# Patient Record
Sex: FEMALE | Race: WHITE | NOT HISPANIC OR LATINO | Employment: UNEMPLOYED | URBAN - METROPOLITAN AREA
[De-identification: names, ages, dates, MRNs, and addresses within clinical notes are randomized per-mention and may not be internally consistent; named-entity substitution may affect disease eponyms.]

---

## 2018-12-04 RX ORDER — LAMIVUDINE AND ZIDOVUDINE 150; 300 MG/1; MG/1
1 TABLET, FILM COATED ORAL EVERY 12 HOURS
COMMUNITY
End: 2018-12-05 | Stop reason: ALTCHOICE

## 2018-12-04 RX ORDER — ALPRAZOLAM 0.25 MG/1
0.25 TABLET ORAL 3 TIMES DAILY
COMMUNITY
End: 2018-12-05

## 2018-12-04 RX ORDER — LIDOCAINE 50 MG/G
1 PATCH TOPICAL
COMMUNITY

## 2018-12-04 RX ORDER — ATORVASTATIN CALCIUM 20 MG/1
20 TABLET, FILM COATED ORAL DAILY
COMMUNITY
End: 2021-11-18

## 2018-12-05 ENCOUNTER — OFFICE VISIT (OUTPATIENT)
Dept: INFECTIOUS DISEASES | Facility: CLINIC | Age: 58
End: 2018-12-05
Payer: COMMERCIAL

## 2018-12-05 VITALS
HEIGHT: 66 IN | SYSTOLIC BLOOD PRESSURE: 118 MMHG | BODY MASS INDEX: 27.32 KG/M2 | HEART RATE: 82 BPM | WEIGHT: 170 LBS | TEMPERATURE: 98 F | OXYGEN SATURATION: 98 % | DIASTOLIC BLOOD PRESSURE: 76 MMHG

## 2018-12-05 DIAGNOSIS — M85.80 OSTEOPENIA, UNSPECIFIED LOCATION: ICD-10-CM

## 2018-12-05 DIAGNOSIS — E78.5 HYPERLIPIDEMIA, UNSPECIFIED HYPERLIPIDEMIA TYPE: ICD-10-CM

## 2018-12-05 DIAGNOSIS — B20 HUMAN IMMUNODEFICIENCY VIRUS (HIV) DISEASE: Primary | ICD-10-CM

## 2018-12-05 DIAGNOSIS — Z79.899 ENCOUNTER FOR LONG-TERM (CURRENT) USE OF MEDICATIONS: ICD-10-CM

## 2018-12-05 DIAGNOSIS — Z12.31 ENCOUNTER FOR SCREENING MAMMOGRAM FOR BREAST CANCER: ICD-10-CM

## 2018-12-05 PROCEDURE — 99214 OFFICE O/P EST MOD 30 MIN: CPT | Mod: ,,, | Performed by: INTERNAL MEDICINE

## 2018-12-05 PROCEDURE — 3008F BODY MASS INDEX DOCD: CPT | Mod: ,,, | Performed by: INTERNAL MEDICINE

## 2018-12-05 RX ORDER — BICTEGRAVIR SODIUM, EMTRICITABINE, AND TENOFOVIR ALAFENAMIDE FUMARATE 50; 200; 25 MG/1; MG/1; MG/1
1 TABLET ORAL DAILY
Refills: 5 | COMMUNITY
Start: 2018-11-26 | End: 2020-12-14 | Stop reason: ALTCHOICE

## 2018-12-05 RX ORDER — PROGESTERONE 100 MG/1
100 CAPSULE ORAL NIGHTLY
Refills: 11 | COMMUNITY
Start: 2018-11-19

## 2018-12-05 NOTE — PROGRESS NOTES
Subjective:       Patient ID: Tiffani Guerrero is a 58 y.o. female.    Chief Complaint:: Follow-up (6 mos check up )    HPI  Been on Biktarvy, which she likes and has not taken the atorvastatin yet  No flu vaccine, and refused in 2016 and 17.  is undergoing chemotherapy  Feels good, has a hormone pellet in her right hip(she is unclear as to what exactly is in it), getting new pellet today   was diagnosed with metastatic colon cancer and is receiving treatment at [facility]    Current Outpatient Medications:     BIKTARVY -25 mg Tab, Take 1 tablet by mouth once daily., Disp: , Rfl: 5    lidocaine (LIDODERM) 5 %, Place 1 patch onto the skin every 24 hours. 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 Ocular    Hyperlipidemia     MVP (mitral valve prolapse) 2010    Negative ETT, nuclear stress 06/2015, echo w/myxomatous MV    Osteopenia 2008    Pyelonephritis 12/10/2012     Past Surgical History:   Procedure Laterality Date    BACK SURGERY  2003    Extensive surgery for thoracic vertebral osteomyelitis (enterobacter) with spinal cord compression. (decompressive laminectomy T11 with post instrumentation T7-8 and T12-L1    Breast Augmentation Bilateral 1984    With redo 2008.    feet  Bilateral     reconstruction     FOOT SURGERY  1978    Maybe 1979     HYSTERECTOMY  1988    And one ovary for endometriosis     Podiactric surgery  09/2016    and 06/2017 to remove 2 screws    RHIZOTOMY      for back pain    Shoulder Injections  2013    For Frozen shoulders     Social History     Socioeconomic History    Marital status:      Spouse name: None    Number of children: 3    Years of education: None    Highest education level: None   Social Needs    Financial resource strain: None    Food insecurity - worry: None    Food insecurity - inability: None    Transportation needs - medical: None    Transportation needs - non-medical: None   Occupational History    Occupation: Disabled   Tobacco Use    Smoking status: Never Smoker    Smokeless tobacco: Never Used   Substance and Sexual Activity    Alcohol use: Yes     Comment: Rarely    Drug use: None    Sexual activity: None   Other Topics Concern    None   Social History Narrative    None     Family History   Problem Relation Age of Onset    Stomach cancer Mother         Metastatic       Travel History: Born in [place], lived in [place], lived in [facility], [place]  Vaccine History: Pneumovax less than 2006, Prevnar 2013, flu vaccine 2012-15, hepatitis A 1, 2 2008, hepatitis B ×3 Menactra 2018, shinGrix ×1 2018  Advanced Directive:   Safer Sex: Monogamous and protected with   Gyn: 6/2018  Mammogram: 12/2017  Bone Density: 2011, osteopenia better  Colonoscopy:2008 diverticulosis, 2017 polyps    Review of Systems    Constitutional: No fever, chills, sweats, fatigue, weakness, weight loss    Eyes: No change in vision, loss of vision, diplopia, photophobia. UTD eye exam    ENT: No sinus drainage, sore throat, mouth pain, or lesions. UTD dental exam    Cardiovascular: No chest pain, BOSS, palpitations or pedal edema    Respiratory: No shortness of breath, BOSS, cough, wheeze, sputum, pleurisy, or hemoptysis    Gastrointestinal: No abdominal pain, nausea, vomiting, diarrhea, constipation, blood in stool, or focal abd pain    Genitourinary: No dysuria,  "hematuria, incontinence, frequency, flank pain, nocturia, or urethritis    Musculoskeletal: No new pain, joint swelling, or injuries. Chronic back pain for which she no longer takes any pain medicine    Integumentary: No new rashes, lesions, or wounds    Neurological: No dizziness, vertigo, unusual headaches, neuropathy, or falls    Psychiatric: No anxiety, depression, memory loss, sleep disturbance or substance abuse    Endocrine: no diabetes, Thyroid normal and feels some much better since she has had hormone pellets inserted    Lymphatic: No lymphadenopathy, blood loss, anemia, or malignancy    Objective:      Blood pressure 118/76, pulse 82, temperature 98 °F (36.7 °C), temperature source Oral, height 5' 6" (1.676 m), weight 77.1 kg (170 lb), SpO2 98 %. Body mass index is 27.44 kg/m².  Physical Exam      General: Alert and attentive, cooperative and in no distress    Eyes: Pupils equal, round, reactive to light, anicteric, EOMI    Neck: Supple, non-tender, no thyromegaly or masses    ENT: EAC patent, TM normal, nares patent, no oral lesions, teeth in good condition, no thrush    Cardiovascular: Regular rate and rhythm, no murmurs, rubs, or gallop    Respiratory: Lungs clear without wheezes, no rales, rub or rhonci    Gastrointestinal: Active bowel sounds, soft, no mass or organomegaly, no tenderness or distention    Genitourinary: No flank tenderness    Vascular: No peripheral edema, phlebitis, pulses normal. Warm and well perfused    Musculoskeletal: Ambulates without difficulty, no acute arthritis, synovitis or myositis. Normal muscle bulk and strength. Still reclines and sits up slowly because of her back    Integumentary: Skin without rashes, lesions, or wounds    AnusRectum:     Neurological: Normal LOC, cranial nerves, speech, reflexes, normal gait    Psychiatric: Normal mood, speech, demeanor    Lymphatic: No cervical, supraclavicular, axillary, or inguinal lymphadenopathy      Wound:     HIV Table: "   HLA-B 5701     TOXOIgG     RPR 9/18/2018 non reactive   HEP A IgG     HBsAg  negative   HBsAb 6/16/2017 non reactive p vax   HBcAb  negative   HBeAb     HBeAg     HCVAb 9/2/2016 non reactive   HBV DNA     HCV RNA     Vitamin D 6/16/2017 normal 83   Chlamydia / GC     TB Gold                       9/2016                 negative      Recent Diagnostics: reviewed 9/2018 lab       Assessment and Plan:           Human immunodeficiency virus (HIV) disease  -     Lipid panel; Future; Expected date: 12/05/2018  -     Comprehensive metabolic panel; Future; Expected date: 12/05/2018  -     HIV RNA, quantitative, PCR; Future; Expected date: 12/05/2018    Hyperlipidemia, unspecified hyperlipidemia type  -     Lipid panel; Future; Expected date: 12/05/2018    Encounter for screening mammogram for breast cancer  -     Mammo Digital Diagnostic Bilat with CAD; Future; Expected date: 12/11/2018    Osteopenia, unspecified location    Encounter for long-term (current) use of medications    Viral load and lipids and CMP, [facility]    Please get a flu vaccine    Bone density end of 2019    Return in 6 months      This note was created using Dragon voice recognition software that occasionally misinterpreted phrases or words.

## 2018-12-05 NOTE — PATIENT INSTRUCTIONS
Viral load and lipids and CMP, [facility]    Please get a flu vaccine    Bone density end of 2019    Return in 6 months

## 2019-06-12 ENCOUNTER — OFFICE VISIT (OUTPATIENT)
Dept: INFECTIOUS DISEASES | Facility: CLINIC | Age: 59
End: 2019-06-12
Payer: COMMERCIAL

## 2019-06-12 VITALS
BODY MASS INDEX: 28.61 KG/M2 | WEIGHT: 178 LBS | TEMPERATURE: 98 F | OXYGEN SATURATION: 98 % | HEIGHT: 66 IN | DIASTOLIC BLOOD PRESSURE: 75 MMHG | HEART RATE: 70 BPM | SYSTOLIC BLOOD PRESSURE: 121 MMHG

## 2019-06-12 DIAGNOSIS — E78.5 HYPERLIPIDEMIA, UNSPECIFIED HYPERLIPIDEMIA TYPE: ICD-10-CM

## 2019-06-12 DIAGNOSIS — B20 HUMAN IMMUNODEFICIENCY VIRUS (HIV) DISEASE: Primary | ICD-10-CM

## 2019-06-12 DIAGNOSIS — Z79.899 ENCOUNTER FOR LONG-TERM (CURRENT) USE OF MEDICATIONS: ICD-10-CM

## 2019-06-12 PROCEDURE — 3008F BODY MASS INDEX DOCD: CPT | Mod: ,,, | Performed by: INTERNAL MEDICINE

## 2019-06-12 PROCEDURE — 99214 OFFICE O/P EST MOD 30 MIN: CPT | Mod: ,,, | Performed by: INTERNAL MEDICINE

## 2019-06-12 PROCEDURE — 3008F PR BODY MASS INDEX (BMI) DOCUMENTED: ICD-10-PCS | Mod: ,,, | Performed by: INTERNAL MEDICINE

## 2019-06-12 PROCEDURE — 99214 PR OFFICE/OUTPT VISIT, EST, LEVL IV, 30-39 MIN: ICD-10-PCS | Mod: ,,, | Performed by: INTERNAL MEDICINE

## 2019-06-12 NOTE — PROGRESS NOTES
Subjective:       Patient ID: Tiffani Guerrero is a 59 y.o. female.    Chief Complaint:: 6 mos check up    HPI  12/5/19: Been on Biktarvy, which she likes and has not taken the atorvastatin yet  No flu vaccine, and refused in 2016 and 17.  is undergoing chemotherapy  Feels good, has a hormone pellet in her right hip(she is unclear as to what exactly is in it), getting new pellet today   was diagnosed with metastatic colon cancer and is receiving treatment at [facility]    6/12/19:  is doing well on chemotherapy for colon cancer. Can't find the lab from December. She is adherent to cholesterol meds.   The hormone pellet is called bioTE(still don't know what it is in it.) and she feels great. The gyn is following her hormone levels and adjusting the dose. Going to the gym and walking.   Gained 7 pounds. Back pain is under control. She takes lots of vitamins    Current Outpatient Medications:     atorvastatin (LIPITOR) 20 MG tablet, Take 20 mg by mouth once daily., Disp: , Rfl:     BIKTARVY -25 mg Tab, Take 1 tablet by mouth once daily., Disp: , Rfl: 5    lidocaine (LIDODERM) 5 %, Place 1 patch onto the skin every 24 hours. 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 Ocular    Hyperlipidemia     MVP (mitral valve prolapse) 2010    Negative ETT, nuclear stress 06/2015, echo w/myxomatous MV    Osteopenia 2008    Pyelonephritis 12/10/2012     Past Surgical History:    Procedure Laterality Date    BACK SURGERY  2003    Extensive surgery for thoracic vertebral osteomyelitis (enterobacter) with spinal cord compression. (decompressive laminectomy T11 with post instrumentation T7-8 and T12-L1    Breast Augmentation Bilateral 1984    With redo 2008.    feet  Bilateral     reconstruction     FOOT SURGERY  1978    Maybe 1979     HYSTERECTOMY  1988    And one ovary for endometriosis    Podiactric surgery  09/2016    and 06/2017 to remove 2 screws    RHIZOTOMY      for back pain    Shoulder Injections  2013    For Frozen shoulders     Social History     Socioeconomic History    Marital status:      Spouse name: Not on file    Number of children: 3    Years of education: Not on file    Highest education level: Not on file   Occupational History    Occupation: Disabled   Social Needs    Financial resource strain: Not on file    Food insecurity:     Worry: Not on file     Inability: Not on file    Transportation needs:     Medical: Not on file     Non-medical: Not on file   Tobacco Use    Smoking status: Never Smoker    Smokeless tobacco: Never Used   Substance and Sexual Activity    Alcohol use: Yes     Comment: Rarely    Drug use: Not on file    Sexual activity: Not on file   Lifestyle    Physical activity:     Days per week: Not on file     Minutes per session: Not on file    Stress: Not on file   Relationships    Social connections:     Talks on phone: Not on file     Gets together: Not on file     Attends Shinto service: Not on file     Active member of club or organization: Not on file     Attends meetings of clubs or organizations: Not on file     Relationship status: Not on file   Other Topics Concern    Not on file   Social History Narrative    Not on file     Family History   Problem Relation Age of Onset    Stomach cancer Mother         Metastatic       Travel History: Born in [place], lived in [place], lived in [place], [place]  Vaccine  "History: Pneumovax less than 2006, Prevnar 2013, flu vaccine 2012-15, hepatitis A 1, 2 2008, hepatitis B ×3 Menactra 2018, shinGrix × 2 2018  Advanced Directive:   Safer Sex: Monogamous and protected with   Gyn: 6/2018  Mammogram: 1/2019  Bone Density: 2011, osteopenia better  Colonoscopy:2008 diverticulosis, 2017 polyps    Review of Systems    Constitutional: No fever, chills, sweats, fatigue, weakness, weight loss. Gained 7 lbs    Eyes: No change in vision, loss of vision, diplopia, photophobia. UTD eye exam    ENT: Mild allergic sinus drainage and uses a Amanda pot with sterile water and a salt solution she bought from the Internet, no sore throat, mouth pain, or lesions. UTD dental exam    Cardiovascular: No chest pain, BOSS, palpitations or pedal edema    Respiratory: No shortness of breath, BOSS, cough, wheeze, sputum, pleurisy, or hemoptysis    Gastrointestinal: No abdominal pain, nausea, vomiting, diarrhea, constipation, blood in stool, or focal abd pain    Genitourinary: No dysuria, hematuria, incontinence, frequency, flank pain, nocturia, or urethritis. She drinks plenty of liquids    Musculoskeletal: No new pain, joint swelling, or injuries. Chronic back pain for which she no longer takes any pain medicine    Integumentary: No new rashes, lesions, or wounds and was examined by dermatology recently    Neurological: No dizziness, vertigo, unusual headaches, neuropathy, or falls    Psychiatric: No anxiety, depression, memory loss, sleep disturbance or substance abuse    Endocrine: no diabetes, Thyroid normal and feels some much better since she has had hormone pellets inserted    Lymphatic: No lymphadenopathy, blood loss, anemia, or malignancy    Objective:      Blood pressure 121/75, pulse 70, temperature 97.7 °F (36.5 °C), height 5' 6" (1.676 m), weight 80.7 kg (178 lb), SpO2 98 %. Body mass index is 28.73 kg/m².  Physical Exam      General: Alert and attentive, cooperative and in no " distress    Eyes: Pupils equal, round, reactive to light, anicteric, EOMI    Neck: Supple, non-tender, no thyromegaly or masses    ENT: EAC patent, TM normal, nares patent, no oral lesions, teeth in good condition, no thrush    Cardiovascular: Regular rate and rhythm, no murmurs, rubs, or gallop    Respiratory: Lungs clear without wheezes, no rales, rub or rhonci    Gastrointestinal: Active bowel sounds, soft, no mass or organomegaly, no tenderness or distention    Genitourinary: No flank tenderness    Vascular: No peripheral edema, phlebitis, pulses normal. Warm and well perfused    Musculoskeletal: Ambulates without difficulty, no acute arthritis, synovitis or myositis. Normal muscle bulk and strength. Still reclines and sits up slowly because of her back    Integumentary: Skin without rashes, lesions, or wounds. She has a café au lait spot left lower flank and a benign nevus upper buttock    AnusRectum:     Neurological: Normal LOC, cranial nerves, speech, reflexes, normal gait. She still moves a little slowly getting up and down from the exam table   Psychiatric: Normal mood, speech, demeanor    Lymphatic: No cervical, supraclavicular, axillary, or inguinal lymphadenopathy      Wound:     HIV Table:   HLA-B 5701     TOXOIgG 8/18/2004 negative   RPR 9/18/2018 non reactive   HEP A IgG     HBsAg  negative   HBsAb 6/16/2017 non reactive p vax   HBcAb  negative   HBeAb     HBeAg     HCVAb 9/2/2016 non reactive   HBV DNA     HCV RNA 8/18/2004 negative   Vitamin D 6/16/2017 normal 83   Chlamydia / GC     TB Gold                       9/2016                 negative      Recent Diagnostics: reviewed 9/2018 lab. The December lab we ordered was not drawn       Assessment and Plan:           Human immunodeficiency virus (HIV) disease  -     CBC auto differential; Future; Expected date: 06/12/2019  -     HIV RNA, quantitative, PCR; Future; Expected date: 06/12/2019  -     Urinalysis; Future; Expected date: 06/12/2019  -      Hemoglobin A1c; Future; Expected date: 06/12/2019    Hyperlipidemia, unspecified hyperlipidemia type  -     Comprehensive metabolic panel; Future; Expected date: 06/12/2019  -     Lipid panel; Future; Expected date: 06/12/2019    Encounter for long-term (current) use of medications           Bone density end of 2019  Lab, fasting  CBC CMP viral load, lipids, UA and hemoglobin A1c    Return for flu shot in November   And visit in 12 2019 with lab before    Same medications      This note was created using Dragon voice recognition software that occasionally misinterpreted phrases or words.

## 2019-06-12 NOTE — PATIENT INSTRUCTIONS
Lab, fasting  CBC CMP viral load, lipids, UA and hemoglobin A1c    Return for flu shot in November   And visit in 12 2019 with lab before    Same medications

## 2019-10-11 ENCOUNTER — OFFICE VISIT (OUTPATIENT)
Dept: ENDOCRINOLOGY | Facility: CLINIC | Age: 59
End: 2019-10-11
Payer: COMMERCIAL

## 2019-10-11 ENCOUNTER — LAB VISIT (OUTPATIENT)
Dept: LAB | Facility: HOSPITAL | Age: 59
End: 2019-10-11
Attending: INTERNAL MEDICINE
Payer: COMMERCIAL

## 2019-10-11 VITALS
TEMPERATURE: 98 F | DIASTOLIC BLOOD PRESSURE: 60 MMHG | HEART RATE: 74 BPM | SYSTOLIC BLOOD PRESSURE: 100 MMHG | WEIGHT: 182.75 LBS | BODY MASS INDEX: 29.37 KG/M2 | HEIGHT: 66 IN

## 2019-10-11 DIAGNOSIS — Z78.0 POSTMENOPAUSAL: ICD-10-CM

## 2019-10-11 DIAGNOSIS — R73.9 HYPERGLYCEMIA: ICD-10-CM

## 2019-10-11 DIAGNOSIS — E03.9 HYPOTHYROIDISM (ACQUIRED): ICD-10-CM

## 2019-10-11 DIAGNOSIS — E78.00 HYPERCHOLESTEROLEMIA: ICD-10-CM

## 2019-10-11 DIAGNOSIS — E06.9 THYROIDITIS: ICD-10-CM

## 2019-10-11 DIAGNOSIS — Z79.890 HORMONE REPLACEMENT THERAPY: ICD-10-CM

## 2019-10-11 DIAGNOSIS — E78.5 HYPERLIPIDEMIA, UNSPECIFIED HYPERLIPIDEMIA TYPE: ICD-10-CM

## 2019-10-11 DIAGNOSIS — Z90.710 POST HYSTERECTOMY MENOPAUSE: ICD-10-CM

## 2019-10-11 DIAGNOSIS — M85.89 OSTEOPENIA OF MULTIPLE SITES: ICD-10-CM

## 2019-10-11 DIAGNOSIS — E55.9 HYPOVITAMINOSIS D: ICD-10-CM

## 2019-10-11 DIAGNOSIS — E89.40 POST HYSTERECTOMY MENOPAUSE: ICD-10-CM

## 2019-10-11 DIAGNOSIS — Z21 HIV POSITIVE: ICD-10-CM

## 2019-10-11 DIAGNOSIS — E03.9 HYPOTHYROIDISM (ACQUIRED): Primary | ICD-10-CM

## 2019-10-11 DIAGNOSIS — E04.9 GOITER: ICD-10-CM

## 2019-10-11 DIAGNOSIS — R79.89 ABNORMAL THYROID BLOOD TEST: ICD-10-CM

## 2019-10-11 DIAGNOSIS — E07.9 THYROID DISEASE: ICD-10-CM

## 2019-10-11 LAB
ALBUMIN SERPL BCP-MCNC: 4.2 G/DL (ref 3.5–5.2)
ALP SERPL-CCNC: 83 U/L (ref 55–135)
ALT SERPL W/O P-5'-P-CCNC: 23 U/L (ref 10–44)
ANION GAP SERPL CALC-SCNC: 9 MMOL/L (ref 8–16)
AST SERPL-CCNC: 27 U/L (ref 10–40)
BASOPHILS # BLD AUTO: 0.06 K/UL (ref 0–0.2)
BASOPHILS NFR BLD: 0.9 % (ref 0–1.9)
BILIRUB SERPL-MCNC: 0.3 MG/DL (ref 0.1–1)
BUN SERPL-MCNC: 15 MG/DL (ref 6–20)
CALCIUM SERPL-MCNC: 9.9 MG/DL (ref 8.7–10.5)
CHLORIDE SERPL-SCNC: 102 MMOL/L (ref 95–110)
CHOLEST SERPL-MCNC: 221 MG/DL (ref 120–199)
CHOLEST/HDLC SERPL: 3.9 {RATIO} (ref 2–5)
CO2 SERPL-SCNC: 27 MMOL/L (ref 23–29)
CREAT SERPL-MCNC: 0.9 MG/DL (ref 0.5–1.4)
DIFFERENTIAL METHOD: ABNORMAL
EOSINOPHIL # BLD AUTO: 0.4 K/UL (ref 0–0.5)
EOSINOPHIL NFR BLD: 6.8 % (ref 0–8)
ERYTHROCYTE [DISTWIDTH] IN BLOOD BY AUTOMATED COUNT: 12.8 % (ref 11.5–14.5)
EST. GFR  (AFRICAN AMERICAN): >60 ML/MIN/1.73 M^2
EST. GFR  (NON AFRICAN AMERICAN): >60 ML/MIN/1.73 M^2
GLUCOSE SERPL-MCNC: 92 MG/DL (ref 70–110)
HCT VFR BLD AUTO: 46 % (ref 37–48.5)
HDLC SERPL-MCNC: 56 MG/DL (ref 40–75)
HDLC SERPL: 25.3 % (ref 20–50)
HGB BLD-MCNC: 14.9 G/DL (ref 12–16)
IMM GRANULOCYTES # BLD AUTO: 0.01 K/UL (ref 0–0.04)
IMM GRANULOCYTES NFR BLD AUTO: 0.2 % (ref 0–0.5)
LDLC SERPL CALC-MCNC: 141.8 MG/DL (ref 63–159)
LYMPHOCYTES # BLD AUTO: 1.9 K/UL (ref 1–4.8)
LYMPHOCYTES NFR BLD: 29.7 % (ref 18–48)
MCH RBC QN AUTO: 32.5 PG (ref 27–31)
MCHC RBC AUTO-ENTMCNC: 32.4 G/DL (ref 32–36)
MCV RBC AUTO: 100 FL (ref 82–98)
MONOCYTES # BLD AUTO: 0.6 K/UL (ref 0.3–1)
MONOCYTES NFR BLD: 9.1 % (ref 4–15)
NEUTROPHILS # BLD AUTO: 3.5 K/UL (ref 1.8–7.7)
NEUTROPHILS NFR BLD: 53.3 % (ref 38–73)
NONHDLC SERPL-MCNC: 165 MG/DL
NRBC BLD-RTO: 0 /100 WBC
PLATELET # BLD AUTO: 217 K/UL (ref 150–350)
PMV BLD AUTO: 12 FL (ref 9.2–12.9)
POTASSIUM SERPL-SCNC: 4 MMOL/L (ref 3.5–5.1)
PROT SERPL-MCNC: 7.4 G/DL (ref 6–8.4)
RBC # BLD AUTO: 4.58 M/UL (ref 4–5.4)
SODIUM SERPL-SCNC: 138 MMOL/L (ref 136–145)
TRIGL SERPL-MCNC: 116 MG/DL (ref 30–150)
URATE SERPL-MCNC: 4.2 MG/DL (ref 2.4–5.7)
WBC # BLD AUTO: 6.49 K/UL (ref 3.9–12.7)

## 2019-10-11 PROCEDURE — 84146 ASSAY OF PROLACTIN: CPT

## 2019-10-11 PROCEDURE — 86800 THYROGLOBULIN ANTIBODY: CPT | Mod: 91

## 2019-10-11 PROCEDURE — 83970 ASSAY OF PARATHORMONE: CPT

## 2019-10-11 PROCEDURE — 83002 ASSAY OF GONADOTROPIN (LH): CPT

## 2019-10-11 PROCEDURE — 82306 VITAMIN D 25 HYDROXY: CPT

## 2019-10-11 PROCEDURE — 82627 DEHYDROEPIANDROSTERONE: CPT

## 2019-10-11 PROCEDURE — 84144 ASSAY OF PROGESTERONE: CPT

## 2019-10-11 PROCEDURE — 3008F PR BODY MASS INDEX (BMI) DOCUMENTED: ICD-10-PCS | Mod: CPTII,S$GLB,, | Performed by: INTERNAL MEDICINE

## 2019-10-11 PROCEDURE — 84439 ASSAY OF FREE THYROXINE: CPT

## 2019-10-11 PROCEDURE — 83001 ASSAY OF GONADOTROPIN (FSH): CPT

## 2019-10-11 PROCEDURE — 84270 ASSAY OF SEX HORMONE GLOBUL: CPT

## 2019-10-11 PROCEDURE — 82642 DIHYDROTESTOSTERONE: CPT

## 2019-10-11 PROCEDURE — 86376 MICROSOMAL ANTIBODY EACH: CPT

## 2019-10-11 PROCEDURE — 99999 PR PBB SHADOW E&M-NEW PATIENT-LVL III: ICD-10-PCS | Mod: PBBFAC,,, | Performed by: INTERNAL MEDICINE

## 2019-10-11 PROCEDURE — 84550 ASSAY OF BLOOD/URIC ACID: CPT

## 2019-10-11 PROCEDURE — 86800 THYROGLOBULIN ANTIBODY: CPT

## 2019-10-11 PROCEDURE — 84480 ASSAY TRIIODOTHYRONINE (T3): CPT

## 2019-10-11 PROCEDURE — 85025 COMPLETE CBC W/AUTO DIFF WBC: CPT

## 2019-10-11 PROCEDURE — 83036 HEMOGLOBIN GLYCOSYLATED A1C: CPT

## 2019-10-11 PROCEDURE — 84443 ASSAY THYROID STIM HORMONE: CPT

## 2019-10-11 PROCEDURE — 80053 COMPREHEN METABOLIC PANEL: CPT

## 2019-10-11 PROCEDURE — 99204 OFFICE O/P NEW MOD 45 MIN: CPT | Mod: S$GLB,,, | Performed by: INTERNAL MEDICINE

## 2019-10-11 PROCEDURE — 80061 LIPID PANEL: CPT

## 2019-10-11 PROCEDURE — 99204 PR OFFICE/OUTPT VISIT, NEW, LEVL IV, 45-59 MIN: ICD-10-PCS | Mod: S$GLB,,, | Performed by: INTERNAL MEDICINE

## 2019-10-11 PROCEDURE — 3008F BODY MASS INDEX DOCD: CPT | Mod: CPTII,S$GLB,, | Performed by: INTERNAL MEDICINE

## 2019-10-11 PROCEDURE — 82626 DEHYDROEPIANDROSTERONE: CPT

## 2019-10-11 PROCEDURE — 99999 PR PBB SHADOW E&M-NEW PATIENT-LVL III: CPT | Mod: PBBFAC,,, | Performed by: INTERNAL MEDICINE

## 2019-10-11 PROCEDURE — 82672 ASSAY OF ESTROGEN: CPT

## 2019-10-11 PROCEDURE — 82670 ASSAY OF TOTAL ESTRADIOL: CPT

## 2019-10-11 RX ORDER — LEVOTHYROXINE SODIUM 125 UG/1
125 CAPSULE ORAL DAILY
Qty: 90 CAPSULE | Refills: 3 | Status: SHIPPED | OUTPATIENT
Start: 2019-10-11 | End: 2021-05-19

## 2019-10-11 RX ORDER — THYROID, PORCINE 30 MG/1
TABLET ORAL
Refills: 2 | COMMUNITY
Start: 2019-09-26 | End: 2019-10-11 | Stop reason: ALTCHOICE

## 2019-10-11 RX ORDER — LEVOTHYROXINE SODIUM 125 UG/1
125 CAPSULE ORAL DAILY
Qty: 90 CAPSULE | Refills: 3 | Status: SHIPPED | OUTPATIENT
Start: 2019-10-11 | End: 2019-10-11 | Stop reason: SDUPTHER

## 2019-10-11 NOTE — PROGRESS NOTES
Subjective:      Patient ID: Tiffani Guerrero is a 59 y.o. female.    Chief Complaint:  thyroid issues    59 yr old lady seen for initial care visit today on account of presumed thyroid functional disorder.    History of Present Illness    Patient is a 59 yr old lady seen for initial care visit today on account of presumed thyroid functional disorder.   She has the following established comorbidities;    1. Hypothyroidism (acquired)     2. Thyroiditis     3. Goiter     4. Abnormal thyroid blood test     5. Thyroid disease     6. Osteopenia of multiple sites     7. Postmenopausal     8. Hyperlipidemia, unspecified hyperlipidemia type     9. Hypercholesterolemia     10. HIV positive       She is presently on armor thyroid for thyroid hormone repletion; 30mg QD. She is HIV +ve being ffed by Dr Mesa and her most recent viral load is undetectable. It appears she may have contracted this via tainted blood transfusions. She is on Bikvarty for this.   Her baseline Alexandria score is 14. Patient has never had a sleep study in the past.  Patient does not snore. Apparently does not have restless legs. She dreams somewhat infrequently.  Patient has been known to have hypothyroidism for several years and was placed on Armor thyroid ~ 5mths.  She c/o chronic fatigue and cold intolerance.  Patients GYN is Dr Brush in [place]. She received biote ( which are compounded pellets which are implanted every ~ 12 weeks. This is a bio-identical hormone preparation). She is also on prometrium 100mg QHs.  Patient is s/p back surgery for benign growth removal.  Patients younger sister has  hypothyroidism as well.    Grav 6 Para 3+3 (one spontaneous and 2 induced).  Patient does not smoke.  Patient drinks ~ 2 glasses of wine monthly.        Review of Systems   Constitutional: Positive for fatigue (chronic) and unexpected weight change (progressive weight gain). Negative for activity change, appetite change, chills and diaphoresis.  "  HENT: Negative for facial swelling, hearing loss, sinus pressure, sore throat, trouble swallowing and voice change.    Eyes: Negative for photophobia and visual disturbance.   Respiratory: Negative for apnea, cough, chest tightness, shortness of breath, wheezing and stridor.    Cardiovascular: Negative for chest pain, palpitations and leg swelling.   Gastrointestinal: Negative for abdominal distention, abdominal pain, constipation, diarrhea, nausea and vomiting.   Endocrine: Negative for cold intolerance, heat intolerance, polydipsia, polyphagia and polyuria.   Genitourinary: Negative for difficulty urinating, dysuria, flank pain, frequency, hematuria, menstrual problem (postmenopausal) and urgency.   Musculoskeletal: Negative for arthralgias, back pain, gait problem, joint swelling, myalgias, neck pain and neck stiffness.   Skin: Negative for color change and pallor.   Neurological: Negative for dizziness, tremors, seizures, syncope, weakness, light-headedness, numbness and headaches.   Hematological: Does not bruise/bleed easily.   Psychiatric/Behavioral: Negative for agitation, confusion, dysphoric mood, hallucinations and sleep disturbance. The patient is not nervous/anxious.        Objective: Ht 5' 6" (1.676 m)   Wt 82.9 kg (182 lb 12.2 oz)   BMI 29.50 kg/m²  /60 (BP Location: Left arm, Patient Position: Sitting, BP Method: Medium (Manual))   Pulse 74   Temp 97.8 °F (36.6 °C) (Oral)   Ht 5' 6" (1.676 m)   Wt 82.9 kg (182 lb 12.2 oz)   BMI 29.50 kg/m²  Body surface area is 1.96 meters squared.           Physical Exam   Constitutional: She is oriented to person, place, and time. Vital signs are normal. She appears well-developed and well-nourished. She does not appear ill. No distress.   Pleasant young lady. Not pale, anicteric and afebrile. Well hydrated. Clinically comfortable. Not in any acute distress.   HENT:   Head: Normocephalic and atraumatic. Not macrocephalic. Head is without right " periorbital erythema and without left periorbital erythema. Hair is normal.   Nose: Nose normal.   Mouth/Throat: Oropharynx is clear and moist. Mucous membranes are not pale and not dry. No oropharyngeal exudate.   No nuchal AN. Mallampati grade 2 fauces.   Eyes: Pupils are equal, round, and reactive to light. Conjunctivae, EOM and lids are normal. Right eye exhibits no discharge. Left eye exhibits no discharge. No scleral icterus.   Neck: Trachea normal, normal range of motion, full passive range of motion without pain and phonation normal. Neck supple. Normal carotid pulses and no JVD present. Carotid bruit is not present. No tracheal deviation present. No thyroid mass and no thyromegaly present.   Cardiovascular: Normal rate, regular rhythm, S1 normal, S2 normal, normal heart sounds, intact distal pulses and normal pulses. PMI is not displaced. Exam reveals no gallop.   No murmur heard.  Pulmonary/Chest: Effort normal and breath sounds normal. No respiratory distress. She has no wheezes. She has no rales.   Abdominal: Soft. Bowel sounds are normal. She exhibits no distension and no mass. There is no hepatosplenomegaly, splenomegaly or hepatomegaly. There is no tenderness. There is no rebound, no guarding and no CVA tenderness. No hernia. Hernia confirmed negative in the ventral area.   Musculoskeletal: She exhibits no edema or tenderness.        Right shoulder: She exhibits normal range of motion, no bony tenderness, no crepitus, no deformity, no pain, no spasm, normal pulse and normal strength.   No pedal edema nor calf swelling   Lymphadenopathy:     She has no cervical adenopathy.        Right: No inguinal adenopathy present.        Left: No inguinal adenopathy present.   Neurological: She is alert and oriented to person, place, and time. She has normal strength and normal reflexes. She displays no atrophy, no tremor and normal reflexes. No cranial nerve deficit or sensory deficit. She exhibits normal muscle  tone. Coordination and gait normal.   Skin: Skin is warm, dry and intact. No bruising, no ecchymosis, no petechiae and no rash noted. She is not diaphoretic. No cyanosis or erythema. No pallor. Nails show no clubbing.   Psychiatric: She has a normal mood and affect. Her speech is normal and behavior is normal. Judgment and thought content normal. Cognition and memory are normal.   Nursing note and vitals reviewed.      Lab Review:     No recent labs available for review in the [facility] data repository.    Assessment:     1. Hypothyroidism (acquired)  US Soft Tissue Head Neck Thyroid    T4, free    Thyroglobulin    T3    TSH    CBC auto differential    Lipid panel    Uric acid   2. Thyroiditis  US Soft Tissue Head Neck Thyroid    Thyroglobulin    Thyroid peroxidase antibody    Anti-thyroglobulin antibody   3. Goiter  US Soft Tissue Head Neck Thyroid   4. Abnormal thyroid blood test     5. Thyroid disease     6. Osteopenia of multiple sites  Comprehensive metabolic panel    Vitamin D    PTH, intact    Testosterone Panel    Dihydrotestosterone    DHEA    DHEA-sulfate    Estradiol    Estrogens, total    Progesterone    Prolactin    Follicle stimulating hormone    Luteinizing hormone   7. Postmenopausal  DXA Bone Density Spine And Hip    Comprehensive metabolic panel   8. Hyperlipidemia, unspecified hyperlipidemia type  Comprehensive metabolic panel    Lipid panel   9. Hypercholesterolemia  Comprehensive metabolic panel    Lipid panel   10. HIV positive     11. Hypovitaminosis D  Vitamin D    PTH, intact   12. Hyperglycemia  Hemoglobin A1c   13. Post hysterectomy menopause     14. Hormone replacement therapy  Testosterone Panel    Dihydrotestosterone    DHEA    DHEA-sulfate    Estradiol    Estrogens, total    Progesterone    Prolactin    Follicle stimulating hormone    Luteinizing hormone      Regarding hypothyroidism; switch from armor thyroid to prescription LT4 125mcg QD.  To obtain screening thyroid USs to exclude any  associated thyroid nodular disease.  Regarding osteopenia; to obtain screening DEXA. To continue calcium and vitamin d supplements in the interim.  Regarding hyperilidemia; to check current lipid statis. To continue antilipidemics as before.  Regarding HRT; ongoing pellet repletion therapy with her GYN. Will check current full reproductive hormone profile.  Regarding HIV +ve status; undetectable viral load. To continue ongoing ffup with Dr Mesa and to discuss with her regarding potential metabolic side effects of her present antiretroviral treatment regimen.      Plan:     FFup in ~ 4mths

## 2019-10-12 LAB
25(OH)D3+25(OH)D2 SERPL-MCNC: 38 NG/ML (ref 30–96)
DHEA-S SERPL-MCNC: 78.5 UG/DL (ref 29.7–182.2)
ESTIMATED AVG GLUCOSE: 105 MG/DL (ref 68–131)
ESTRADIOL SERPL-MCNC: 111 PG/ML
FSH SERPL-ACNC: 27.7 MIU/ML
HBA1C MFR BLD HPLC: 5.3 % (ref 4–5.6)
LH SERPL-ACNC: 16.7 MIU/ML
PROGEST SERPL-MCNC: 1.6 NG/ML
PROLACTIN SERPL IA-MCNC: 5.4 NG/ML (ref 5.2–26.5)
PTH-INTACT SERPL-MCNC: 45 PG/ML (ref 9–77)
T3 SERPL-MCNC: 68 NG/DL (ref 60–180)
T4 FREE SERPL-MCNC: 0.81 NG/DL (ref 0.71–1.51)
TSH SERPL DL<=0.005 MIU/L-ACNC: 1.68 UIU/ML (ref 0.4–4)

## 2019-10-14 ENCOUNTER — HOSPITAL ENCOUNTER (OUTPATIENT)
Dept: RADIOLOGY | Facility: CLINIC | Age: 59
Discharge: HOME | End: 2019-10-14
Attending: INTERNAL MEDICINE
Payer: COMMERCIAL

## 2019-10-14 DIAGNOSIS — E04.9 GOITER: ICD-10-CM

## 2019-10-14 DIAGNOSIS — Z78.0 POSTMENOPAUSAL: ICD-10-CM

## 2019-10-14 DIAGNOSIS — E03.9 HYPOTHYROIDISM (ACQUIRED): ICD-10-CM

## 2019-10-14 DIAGNOSIS — E06.9 THYROIDITIS: ICD-10-CM

## 2019-10-14 PROBLEM — E04.1 NODULAR THYROID DISEASE: Status: ACTIVE | Noted: 2019-10-14

## 2019-10-14 LAB
ESTROGEN SERPL-MCNC: 337 PG/ML
THRYOGLOBULIN INTERPRETATION: ABNORMAL
THYROGLOB AB SERPL IA-ACNC: <4 IU/ML (ref 0–3.9)
THYROGLOB AB SERPL-ACNC: <1.8 IU/ML
THYROGLOB SERPL-MCNC: 9.7 NG/ML
THYROPEROXIDASE IGG SERPL-ACNC: <6 IU/ML

## 2019-10-14 PROCEDURE — 77080 DEXA BONE DENSITY SPINE HIP: ICD-10-PCS | Mod: 26,,, | Performed by: RADIOLOGY

## 2019-10-14 PROCEDURE — 76536 US EXAM OF HEAD AND NECK: CPT | Mod: 26,,, | Performed by: RADIOLOGY

## 2019-10-14 PROCEDURE — 77080 DXA BONE DENSITY AXIAL: CPT | Mod: 26,,, | Performed by: RADIOLOGY

## 2019-10-14 PROCEDURE — 76536 US EXAM OF HEAD AND NECK: CPT | Mod: TC,PO

## 2019-10-14 PROCEDURE — 77080 DXA BONE DENSITY AXIAL: CPT | Mod: TC,PO

## 2019-10-14 PROCEDURE — 76536 US SOFT TISSUE HEAD NECK THYROID: ICD-10-PCS | Mod: 26,,, | Performed by: RADIOLOGY

## 2019-10-16 LAB
ALBUMIN SERPL-MCNC: 4.5 G/DL (ref 3.6–5.1)
SHBG SERPL-SCNC: 104 NMOL/L (ref 14–73)
TESTOST FREE SERPL-MCNC: 16.9 PG/ML (ref 0.2–5)
TESTOST SERPL-MCNC: 366 NG/DL (ref 2–45)
TESTOSTERONE.FREE+WB SERPL-MCNC: 34.7 NG/DL (ref 0.5–8.5)

## 2019-10-17 LAB
ANDROSTANOLONE SERPL-MCNC: 126 PG/ML
DHEA SERPL-MCNC: 0.98 NG/ML (ref 0.63–4.7)

## 2019-12-12 ENCOUNTER — OFFICE VISIT (OUTPATIENT)
Dept: INFECTIOUS DISEASES | Facility: CLINIC | Age: 59
End: 2019-12-12
Payer: COMMERCIAL

## 2019-12-12 VITALS
HEART RATE: 77 BPM | HEIGHT: 66 IN | DIASTOLIC BLOOD PRESSURE: 53 MMHG | SYSTOLIC BLOOD PRESSURE: 117 MMHG | OXYGEN SATURATION: 100 % | TEMPERATURE: 97 F | BODY MASS INDEX: 29.57 KG/M2 | WEIGHT: 184 LBS

## 2019-12-12 DIAGNOSIS — M85.80 OSTEOPENIA, UNSPECIFIED LOCATION: ICD-10-CM

## 2019-12-12 DIAGNOSIS — B20 HUMAN IMMUNODEFICIENCY VIRUS (HIV) DISEASE: Primary | ICD-10-CM

## 2019-12-12 DIAGNOSIS — E78.5 HYPERLIPIDEMIA, UNSPECIFIED HYPERLIPIDEMIA TYPE: ICD-10-CM

## 2019-12-12 DIAGNOSIS — Z79.899 ENCOUNTER FOR LONG-TERM (CURRENT) USE OF MEDICATIONS: ICD-10-CM

## 2019-12-12 PROCEDURE — 3008F PR BODY MASS INDEX (BMI) DOCUMENTED: ICD-10-PCS | Mod: S$GLB,,, | Performed by: INTERNAL MEDICINE

## 2019-12-12 PROCEDURE — 3008F BODY MASS INDEX DOCD: CPT | Mod: S$GLB,,, | Performed by: INTERNAL MEDICINE

## 2019-12-12 PROCEDURE — 99214 OFFICE O/P EST MOD 30 MIN: CPT | Mod: S$GLB,,, | Performed by: INTERNAL MEDICINE

## 2019-12-12 PROCEDURE — 99214 PR OFFICE/OUTPT VISIT, EST, LEVL IV, 30-39 MIN: ICD-10-PCS | Mod: S$GLB,,, | Performed by: INTERNAL MEDICINE

## 2019-12-12 NOTE — PATIENT INSTRUCTIONS
Continue biktarvy  If you are exposed to the flu or feel that you may be getting the flu, call me the first day of symptoms.    Please go for your lab in the next week    Return in 6 months  Call me if you need me    [facility]  7 lessons from Terese,     By Cynthia Prather MD

## 2019-12-12 NOTE — PROGRESS NOTES
Subjective:       Patient ID: Tiffani Guerrero is a 59 y.o. female.    Chief Complaint:: HIV Positive/AIDS    HPI  12/5/19: Been on Biktarvy, which she likes and has not taken the atorvastatin yet  No flu vaccine, and refused in 2016 and 17.  is undergoing chemotherapy  Feels good, has a hormone pellet in her right hip(she is unclear as to what exactly is in it), getting new pellet today   was diagnosed with metastatic colon cancer and is receiving treatment at [facility]    6/12/19:  is doing well on chemotherapy for colon cancer. Can't find the lab from December. She is adherent to cholesterol meds.   The hormone pellet is called bioTE(still don't know what it is in it.) and she feels great. The gyn is following her hormone levels and adjusting the dose. Going to the gym and walking.   Gained 7 pounds. Back pain is under control. She takes lots of vitamins    12/12/19:  is very ill, down to 102#, has had bowel obstructions from metastatic cancer. She was receiving thyroid hormone from a holistic doctor and referred herself to an endocrinologist. Lots of labs were done which I reviewed. She is now on synthroid and feels better. She has gained weight. She is adherent to meds but about ot run out. She does not want a flu vaccine, fearing it will make her sick and she has to care for her .     Current Outpatient Medications:     atorvastatin (LIPITOR) 20 MG tablet, Take 20 mg by mouth once daily., Disp: , Rfl:     BIKTARVY -25 mg Tab, Take 1 tablet by mouth once daily., Disp: , Rfl: 5    levothyroxine (TIROSINT) 125 mcg Cap, Take 125 mcg by mouth once daily., Disp: 90 capsule, Rfl: 3    lidocaine (LIDODERM) 5 %, Place 1 patch onto the skin every 24 hours. Remove & Discard patch within 12 hours or as directed by MD, Disp: , Rfl:     progesterone (PROMETRIUM) 100 MG capsule, Take 100 mg by mouth every evening., Disp: , Rfl: 11    yxetstjro-cjzwhgsm-bvcdikr ala (BIKTARVY)  -25 mg per tablet, Take 1 tablet by mouth once daily., Disp: 30 tablet, Rfl: 6   Fish oil 2 BID    Review of patient's allergies indicates:   Allergen Reactions    Sulfa (sulfonamide antibiotics)      Past Medical History:   Diagnosis Date    Diverticulitis 11/2017    HIV (human immunodeficiency virus infection) 2003    no complications, santiago<200 in 2003    Hx of migraines     ? Ocular    Hyperlipidemia     MVP (mitral valve prolapse) 2010    Negative ETT, nuclear stress 06/2015, echo w/myxomatous MV    Osteomyelitis of thoracic spine 2003    Enterobacter    Osteopenia 2008    Pyelonephritis 12/10/2012     Past Surgical History:   Procedure Laterality Date    BACK SURGERY  2003    Extensive surgery for thoracic vertebral osteomyelitis (enterobacter) with spinal cord compression. (decompressive laminectomy T11 with post instrumentation T7-8 and T12-L1    Breast Augmentation Bilateral 1984    With redo 2008.    feet  Bilateral     reconstruction     FOOT SURGERY  1978    Maybe 1979     HYSTERECTOMY  1988    And one ovary for endometriosis    Podiactric surgery  09/2016    and 06/2017 to remove 2 screws    RHIZOTOMY      for back pain    Shoulder Injections  2013    For Frozen shoulders     Social History     Socioeconomic History    Marital status:      Spouse name: Not on file    Number of children: 3    Years of education: Not on file    Highest education level: Not on file   Occupational History    Occupation: Disabled   Social Needs    Financial resource strain: Not on file    Food insecurity:     Worry: Not on file     Inability: Not on file    Transportation needs:     Medical: Not on file     Non-medical: Not on file   Tobacco Use    Smoking status: Never Smoker    Smokeless tobacco: Never Used   Substance and Sexual Activity    Alcohol use: Yes     Comment: Rarely    Drug use: Not on file    Sexual activity: Not on file   Lifestyle    Physical activity:     Days per  week: Not on file     Minutes per session: Not on file    Stress: Not on file   Relationships    Social connections:     Talks on phone: Not on file     Gets together: Not on file     Attends [employer] service: Not on file     Active member of club or organization: Not on file     Attends meetings of clubs or organizations: Not on file     Relationship status: Not on file   Other Topics Concern    Not on file   Social History Narrative    Not on file     Family History   Problem Relation Age of Onset    Stomach cancer Mother         Metastatic       Travel History: Born in [place], lived in [place], lived in [facility], [place]  Vaccine History: Pneumovax less than 2006, Prevnar 2013, flu vaccine 2012-15, hepatitis A 1, 2 2008, hepatitis B ×3 Menactra 2018, shinGrix × 2 2018  Advanced Directive:   Safer Sex: Monogamous and protected with   Gyn: 6/2019  Mammogram: 1/2019  Bone Density: 2011, osteopenia better, 10/2019    Colonoscopy:2008 diverticulosis, 2017 polyps    Review of Systems    Constitutional: No fever, chills, sweats, fatigue, weakness, weight loss.     Eyes: No change in vision, loss of vision, diplopia, photophobia. UTD eye exam    ENT:  uses a Amanda pot with sterile water and a salt solution she bought from the Internet, no sore throat, mouth pain, or lesions. UTD dental exam    Cardiovascular: No chest pain, BOSS, palpitations or pedal edema    Respiratory: No shortness of breath, BOSS, cough, wheeze, sputum,     Gastrointestinal: No abdominal pain, nausea, vomiting, diarrhea, constipation, blood in stool, or focal abd pain    Genitourinary: No dysuria, . She drinks plenty of liquids    Musculoskeletal: No new pain, joint swelling, or injuries. Chronic back pain for which she no longer takes any pain medicine    Integumentary: No new rashes, lesions, or wounds     Neurological: No dizziness, vertigo, unusual headaches, neuropathy, or falls    Psychiatric: coping exceptionally well with the  "demands of her 's health and his decline. We talked about end oflife issues.    Endocrine: no diabetes, and now on thyroid replacement    Lymphatic: No lymphadenopathy, blood loss, anemia, or malignancy    Objective:      Blood pressure (!) 117/53, pulse 77, temperature 96.8 °F (36 °C), temperature source Oral, height 5' 6" (1.676 m), weight 83.5 kg (184 lb), SpO2 100 %. Body mass index is 29.7 kg/m².  Physical Exam      General: Alert and attentive, cooperative and in no distress    Eyes: Pupils equal, round, reactive to light, anicteric, EOMI    Neck: Supple, non-tender, no thyromegaly or masses    ENT: EAC patent, TM normal, nares patent, no oral lesions, teeth in good condition, no thrush    Cardiovascular: Regular rate and rhythm, no murmurs, rubs, or gallop    Respiratory: Lungs clear without wheezes, no rales, rub or rhonci    Gastrointestinal: Active bowel sounds, soft, no mass or organomegaly, no tenderness or distention    Genitourinary: No flank tenderness    Vascular: No peripheral edema, phlebitis, pulses normal. Warm and well perfused    Musculoskeletal: Ambulates without difficulty, no acute arthritis, synovitis or myositis. Normal muscle bulk and strength. Still reclines and sits up slowly because of her back    Integumentary: Skin without rashes, lesions, or wounds. She has a café au lait spot left lower flank and a benign nevus upper buttock    AnusRectum:     Neurological: Normal LOC, cranial nerves, speech, reflexes, normal gait. She still moves a little slowly getting up and down from the exam table   Psychiatric: Normal mood, speech, demeanor    Lymphatic: No cervical, supraclavicular, axillary, or inguinal lymphadenopathy      Wound:     HIV Table:   HLA-B 5701     TOXOIgG 8/18/2004 negative   RPR 9/18/2018 non reactive   HEP A IgG     HBsAg  negative   HBsAb 6/16/2017 non reactive p vax   HBcAb  negative   HBeAb     HBeAg     HCVAb 9/2/2016 non reactive   HBV DNA     HCV RNA 8/18/2004 " negative   Vitamin D 6/16/2017 normal 83   Chlamydia / GC     TB Gold                       9/2016                 negative      Recent Diagnostics: reviewed 9/2018 lab. The December lab we ordered was not drawn, nor were june's       Assessment and Plan:           Human immunodeficiency virus (HIV) disease  -     HIV RNA, quantitative, PCR; Future; Expected date: 12/12/2019  -     Quantiferon Gold TB; Future; Expected date: 12/12/2019  -     Urinalysis; Future; Expected date: 12/12/2019  -     RPR; Future; Expected date: 12/12/2019  -     Hepatitis C antibody; Future; Expected date: 12/12/2019    Hyperlipidemia, unspecified hyperlipidemia type    Encounter for long-term (current) use of medications    Osteopenia, unspecified location    Other orders  -     bhzorhlge-rkciwrqu-ohqhpfy ala (BIKTARVY) -25 mg per tablet; Take 1 tablet by mouth once daily.  Dispense: 30 tablet; Refill: 6       Continue biktarvy  If you are exposed to the flu or feel that you may be getting the flu, call me the first day of symptoms.    Please go for your lab in the next week    Return in 6 months  Call me if you need me    [facility]  7 lessons from Terese,     By Cynthia Prather MD  Refusing flu vaccine    Need to compare with report of bone density from 2017, no copy in system        This note was created using Dragon voice recognition software that occasionally misinterpreted phrases or words.

## 2019-12-17 ENCOUNTER — TELEPHONE (OUTPATIENT)
Dept: INFECTIOUS DISEASES | Facility: CLINIC | Age: 59
End: 2019-12-17

## 2019-12-17 LAB
APPEARANCE UR: CLEAR
BILIRUB UR QL STRIP: NEGATIVE
COLOR UR: YELLOW
GAMMA INTERFERON BACKGROUND BLD IA-ACNC: 0.05 IU/ML
GLUCOSE UR QL: NEGATIVE
HCV AB S/CO SERPL IA: <0.1 S/CO RATIO (ref 0–0.9)
HGB UR QL STRIP: NEGATIVE
HIV1 RNA # SERPL NAA+PROBE: <20 COPIES/ML
HIV1 RNA SERPL NAA+PROBE-LOG#: NORMAL LOG10COPY/ML
KETONES UR QL STRIP: NEGATIVE
LEUKOCYTE ESTERASE UR QL STRIP: NEGATIVE
M TB IFN-G BLD-IMP: NEGATIVE
M TB IFN-G CD4+ BCKGRND COR BLD-ACNC: 0.05 IU/ML
MICRO URNS: NORMAL
MITOGEN IGNF BLD-ACNC: >10 IU/ML
NITRITE UR QL STRIP: NEGATIVE
PH UR STRIP: 7 [PH] (ref 5–7.5)
PROT UR QL STRIP: NEGATIVE
QUANTIFERON TB GOLD (INCUBATED): NORMAL
QUANTIFERON TB2 AG VALUE: 0.03 IU/ML
RPR SER QL: NON REACTIVE
SERVICE CMNT-IMP: NORMAL
SP GR UR: 1 (ref 1–1.03)
UROBILINOGEN UR STRIP-MCNC: 0.2 MG/DL (ref 0.2–1)

## 2019-12-17 NOTE — TELEPHONE ENCOUNTER
----- Message from Cynthia Mesa MD sent at 12/16/2019 10:55 AM CST -----  Please let her know sh eis still undetectable

## 2020-01-22 ENCOUNTER — TELEPHONE (OUTPATIENT)
Dept: INFECTIOUS DISEASES | Facility: CLINIC | Age: 60
End: 2020-01-22

## 2020-01-22 NOTE — TELEPHONE ENCOUNTER
Said her Biktarvy is still on back order so she will have to come get samples.  And wants to know if you can prescribe her something for anxiety just a few days.  Said she has this floating feeling and it's hard to think/function.  Said she is concerned about having that feeling for his homecoming service on Friday.    Katia Pelletier, [facility]

## 2020-01-23 RX ORDER — ALPRAZOLAM 0.25 MG/1
0.25 TABLET ORAL 3 TIMES DAILY PRN
Qty: 30 TABLET | Refills: 0 | Status: SHIPPED | OUTPATIENT
Start: 2020-01-23 | End: 2021-01-22

## 2020-02-11 ENCOUNTER — TELEPHONE (OUTPATIENT)
Dept: ENDOCRINOLOGY | Facility: CLINIC | Age: 60
End: 2020-02-11

## 2020-02-11 NOTE — TELEPHONE ENCOUNTER
----- Message from Tamiko Pascual sent at 2/11/2020 12:39 PM CST -----  Contact: patient  Type:  Patient Returning Call    Who Called:  patient  Who Left Message for Patient:  Lucia Aleks  Does the patient know what this is regarding?:  Yes, to schedule a follow up  Best Call Back Number:  [phone]  Additional Information:

## 2020-03-17 ENCOUNTER — TELEPHONE (OUTPATIENT)
Dept: INFECTIOUS DISEASES | Facility: CLINIC | Age: 60
End: 2020-03-17

## 2020-03-17 NOTE — TELEPHONE ENCOUNTER
Said her price for Biktarvy increase with her insurance to $10507 a month.  Then I gave her the web site to get co-pay assistance and it reduced the price down to $600 a month.  She says she can not afford that still.  Can you change her antiviral or go back to the Combivir and Sustiva?    TOM Cruz

## 2020-06-11 ENCOUNTER — OFFICE VISIT (OUTPATIENT)
Dept: INFECTIOUS DISEASES | Facility: CLINIC | Age: 60
End: 2020-06-11
Payer: MEDICARE

## 2020-06-11 VITALS
WEIGHT: 174.81 LBS | BODY MASS INDEX: 27.44 KG/M2 | DIASTOLIC BLOOD PRESSURE: 60 MMHG | HEIGHT: 67 IN | TEMPERATURE: 98 F | SYSTOLIC BLOOD PRESSURE: 100 MMHG

## 2020-06-11 DIAGNOSIS — E78.5 HYPERLIPIDEMIA, UNSPECIFIED HYPERLIPIDEMIA TYPE: ICD-10-CM

## 2020-06-11 DIAGNOSIS — B20 HUMAN IMMUNODEFICIENCY VIRUS (HIV) DISEASE: Primary | ICD-10-CM

## 2020-06-11 DIAGNOSIS — Z79.899 ENCOUNTER FOR LONG-TERM (CURRENT) USE OF MEDICATIONS: ICD-10-CM

## 2020-06-11 DIAGNOSIS — M85.80 OSTEOPENIA, UNSPECIFIED LOCATION: ICD-10-CM

## 2020-06-11 PROCEDURE — 99214 PR OFFICE/OUTPT VISIT, EST, LEVL IV, 30-39 MIN: ICD-10-PCS | Mod: S$GLB,,, | Performed by: INTERNAL MEDICINE

## 2020-06-11 PROCEDURE — 99214 OFFICE O/P EST MOD 30 MIN: CPT | Mod: S$GLB,,, | Performed by: INTERNAL MEDICINE

## 2020-06-11 RX ORDER — EFAVIRENZ 600 MG/1
600 TABLET, FILM COATED ORAL NIGHTLY
COMMUNITY
End: 2022-05-16

## 2020-06-11 RX ORDER — LAMIVUDINE AND ZIDOVUDINE 150; 300 MG/1; MG/1
1 TABLET, FILM COATED ORAL EVERY 12 HOURS
COMMUNITY
End: 2022-05-16

## 2020-06-11 NOTE — PROGRESS NOTES
Subjective:       Patient ID: Tiffani Guerrero is a 60 y.o. female.    Chief Complaint:: HIV Positive/AIDS    HIV Positive/AIDS     19: Been on Biktarvy, which she likes and has not taken the atorvastatin yet  No flu vaccine, and refused in  and .  is undergoing chemotherapy  Feels good, has a hormone pellet in her right hip(she is unclear as to what exactly is in it), getting new pellet today   was diagnosed with metastatic colon cancer and is receiving treatment at [facility]    19:  is doing well on chemotherapy for colon cancer. Can't find the lab from December. She is adherent to cholesterol meds.   The hormone pellet is called bioTE(still don't know what it is in it.) and she feels great. The gyn is following her hormone levels and adjusting the dose. Going to the gym and walking.   Gained 7 pounds. Back pain is under control. She takes lots of vitamins    19:  is very ill, down to 102#, has had bowel obstructions from metastatic cancer. She was receiving thyroid hormone from a holistic doctor and referred herself to an endocrinologist. Lots of labs were done which I reviewed. She is now on synthroid and feels better. She has gained weight. She is adherent to meds but about ot run out. She does not want a flu vaccine, fearing it will make her sick and she has to care for her .     20:   in January. She is doing very well considering. She had gyn follow up and estrogen pellets put into buttock. She is eating healthily. Tolerating meds. Had to go back to combivir sustiva because cost of biktarvy was too great. She still has her baseline medicare and COBRA fro 36 months from her 's job. She has not seen endocrine lately because of COVID but will reschedule. Not taking the lipitor.     Current Outpatient Medications:     ALPRAZolam (XANAX) 0.25 MG tablet, Take 1 tablet (0.25 mg total) by mouth 3 (three) times daily as needed for  Anxiety., Disp: 30 tablet, Rfl: 0    efavirenz (SUSTIVA) 600 mg Tab, Take 600 mg by mouth every evening., Disp: , Rfl:     lamivudine-zidovudine 150-300mg (COMBIVIR) 150-300 mg Tab, Take 1 tablet by mouth every 12 (twelve) hours., Disp: , Rfl:     levothyroxine (TIROSINT) 125 mcg Cap, Take 125 mcg by mouth once daily., Disp: 90 capsule, Rfl: 3    lidocaine (LIDODERM) 5 %, Place 1 patch onto the skin every 24 hours. Remove & Discard patch within 12 hours or as directed by MD, Disp: , Rfl:     progesterone (PROMETRIUM) 100 MG capsule, Take 100 mg by mouth every evening., Disp: , Rfl: 11    atorvastatin (LIPITOR) 20 MG tablet, Take 20 mg by mouth once daily., Disp: , Rfl:     htjxiliar-kpuabswb-fyehngk ala (BIKTARVY) -25 mg per tablet, Take 1 tablet by mouth once daily. (Patient not taking: Reported on 6/11/2020), Disp: 30 tablet, Rfl: 6    BIKTARVY -25 mg Tab, Take 1 tablet by mouth once daily., Disp: , Rfl: 5   Fish oil 2 BID    Review of patient's allergies indicates:   Allergen Reactions    Sulfa (sulfonamide antibiotics)      Past Medical History:   Diagnosis Date    Diverticulitis 11/2017    HIV (human immunodeficiency virus infection) 2003    no complications, santiago<200 in 2003    Hx of migraines     ? Ocular    Hyperlipidemia     MVP (mitral valve prolapse) 2010    Negative ETT, nuclear stress 06/2015, echo w/myxomatous MV    Osteomyelitis of thoracic spine 2003    Enterobacter    Osteopenia 2008    Pyelonephritis 12/10/2012     Past Surgical History:   Procedure Laterality Date    BACK SURGERY  2003    Extensive surgery for thoracic vertebral osteomyelitis (enterobacter) with spinal cord compression. (decompressive laminectomy T11 with post instrumentation T7-8 and T12-L1    Breast Augmentation Bilateral 1984    With redo 2008.    feet  Bilateral     reconstruction     FOOT SURGERY  1978    Maybe 1979     HYSTERECTOMY  1988    And one ovary for endometriosis    Podiactric  surgery  09/2016    and 06/2017 to remove 2 screws    RHIZOTOMY      for back pain    Shoulder Injections  2013    For Frozen shoulders     Social History     Socioeconomic History    Marital status:      Spouse name: Not on file    Number of children: 3    Years of education: Not on file    Highest education level: Not on file   Occupational History    Occupation: Disabled   Social Needs    Financial resource strain: Not on file    Food insecurity:     Worry: Not on file     Inability: Not on file    Transportation needs:     Medical: Not on file     Non-medical: Not on file   Tobacco Use    Smoking status: Never Smoker    Smokeless tobacco: Never Used   Substance and Sexual Activity    Alcohol use: Yes     Comment: Rarely    Drug use: Not on file    Sexual activity: Not on file   Lifestyle    Physical activity:     Days per week: Not on file     Minutes per session: Not on file    Stress: Not on file   Relationships    Social connections:     Talks on phone: Not on file     Gets together: Not on file     Attends Islam service: Not on file     Active member of club or organization: Not on file     Attends meetings of clubs or organizations: Not on file     Relationship status: Not on file   Other Topics Concern    Not on file   Social History Narrative    Not on file     Family History   Problem Relation Age of Onset    Stomach cancer Mother         Metastatic       Travel History: Born in [place], lived in [place], lived in [facility], [place]  Vaccine History: Pneumovax less than 2006, Prevnar 2013, flu vaccine 2012-15, hepatitis A 1, 2 2008, hepatitis B ×3 Menactra 2018, shinGrix × 2 2018  Advanced Directive:   Safer Sex: Monogamous and protected with   Gyn: 6/2019  Mammogram: 1/2019  Bone Density: 2011, osteopenia better, 10/2019    Colonoscopy:2008 diverticulosis, 2017 polyps    Review of Systems    Constitutional: No fever, chills, sweats, fatigue, weakness, weight loss.  "    Eyes: No change in vision, loss of vision, diplopia, photophobia. UTD eye exam    ENT:    no sore throat, mouth pain, or lesions. UTD dental exam    Cardiovascular: No chest pain, BOSS, palpitations or pedal edema    Respiratory: No shortness of breath, BOSS, cough, wheeze, sputum,     Gastrointestinal: No abdominal pain, nausea, vomiting, diarrhea, constipation, blood in stool, or focal abd pain    Genitourinary: No dysuria, . She drinks plenty of liquids    Musculoskeletal: No new pain, joint swelling, or injuries. Chronic back pain for which she no longer takes any pain medicine    Integumentary: No new rashes, lesions, or wounds     Neurological: No dizziness, vertigo, unusual headaches, neuropathy, or falls    Psychiatric: coping exceptionally well with   her 's death    Endocrine: no diabetes, and now on thyroid replacement    Lymphatic: No lymphadenopathy, blood loss, anemia, or malignancy    Objective:      Blood pressure 100/60, temperature 98.4 °F (36.9 °C), height 5' 7" (1.702 m), weight 79.3 kg (174 lb 12.8 oz). Body mass index is 27.38 kg/m².  Physical Exam      General: Alert and attentive, cooperative and in no distress    Eyes: Pupils equal, round, reactive to light, anicteric, EOMI    Neck: Supple, non-tender, no thyromegaly or masses    ENT: EAC patent, TM normal, nares patent, no oral lesions, teeth in good condition, no thrush    Cardiovascular: Regular rate and rhythm, no murmurs, rubs, or gallop    Respiratory: Lungs clear without wheezes, no rales, rub or rhonci    Gastrointestinal: Active bowel sounds, soft, no mass or organomegaly, no tenderness or distention    Genitourinary: No flank tenderness    Vascular: No peripheral edema, phlebitis, pulses normal. Warm and well perfused    Musculoskeletal: Ambulates without difficulty, no acute arthritis, synovitis or myositis. Normal muscle bulk and strength. Still reclines and sits up slowly because of her back    Integumentary: Skin " without rashes, lesions, or wounds. She has a café au lait spot left lower flank and a benign nevus upper buttock    AnusRectum: per gyn    Neurological: Normal LOC, cranial nerves, speech, reflexes, normal gait. She still moves a little slowly getting up and down from the exam table   Psychiatric: Normal mood, speech, demeanor    Lymphatic: No cervical, supraclavicular, axillary, or inguinal lymphadenopathy      Wound:     HIV Table:   HLA-B 5701     TOXOIgG 8/18/2004 negative   RPR 12/2019 non reactive   HEP A IgG     HBsAg  negative   HBsAb 6/16/2017 non reactive p vax   HBcAb  negative   HBeAb     HBeAg     HCVAb 12/2019 non reactive   HBV DNA     HCV RNA 8/18/2004 negative   Vitamin D 6/16/2017 normal 83   Chlamydia / GC     TB Gold                      12/2019                 negative      Recent Diagnostics:       Assessment and Plan:           Human immunodeficiency virus (HIV) disease  -     CBC auto differential; Future; Expected date: 06/11/2020  -     Comprehensive metabolic panel; Future; Expected date: 06/11/2020  -     RPR; Future; Expected date: 06/11/2020  -     Hepatitis C Antibody; Future; Expected date: 06/11/2020  -     HIV RNA, quantitative, PCR; Future; Expected date: 06/11/2020    Hyperlipidemia, unspecified hyperlipidemia type  -     Lipid Panel; Future; Expected date: 06/11/2020    Encounter for long-term (current) use of medications    Osteopenia, unspecified location       Continue combivir and sustiva  Let's look for patient assistance for sustiva    Lab, at [facility], fasting    Flu vaccine in November    Protect yourself from Coronavirus    Follow up 6 months    Need to compare with report of bone density from 2017, no copy in system        This note was created using Dragon voice recognition software that occasionally misinterpreted phrases or words.

## 2020-06-11 NOTE — PATIENT INSTRUCTIONS
Continue combivir and sustiva  Let's look for patient assistance for sustiva    Lab, at [facility], fasting    Flu vaccine in November    Protect yourself from Coronavirus    Follow up 6 months

## 2020-10-24 LAB
ALBUMIN SERPL-MCNC: 4.4 G/DL (ref 3.8–4.9)
ALBUMIN/GLOB SERPL: 1.8 {RATIO} (ref 1.2–2.2)
ALP SERPL-CCNC: 101 IU/L (ref 39–117)
ALT SERPL-CCNC: 16 IU/L (ref 0–32)
AST SERPL-CCNC: 25 IU/L (ref 0–40)
BASOPHILS # BLD AUTO: 0.1 X10E3/UL (ref 0–0.2)
BASOPHILS NFR BLD AUTO: 1 %
BILIRUB SERPL-MCNC: 0.3 MG/DL (ref 0–1.2)
BUN SERPL-MCNC: 12 MG/DL (ref 6–24)
BUN/CREAT SERPL: 13 (ref 9–23)
CALCIUM SERPL-MCNC: 9.3 MG/DL (ref 8.7–10.2)
CHLORIDE SERPL-SCNC: 102 MMOL/L (ref 96–106)
CHOLEST SERPL-MCNC: 231 MG/DL (ref 100–199)
CO2 SERPL-SCNC: 25 MMOL/L (ref 20–29)
CREAT SERPL-MCNC: 0.91 MG/DL (ref 0.57–1)
EOSINOPHIL # BLD AUTO: 0.5 X10E3/UL (ref 0–0.4)
EOSINOPHIL NFR BLD AUTO: 8 %
ERYTHROCYTE [DISTWIDTH] IN BLOOD BY AUTOMATED COUNT: 12.5 % (ref 11.7–15.4)
GLOBULIN SER CALC-MCNC: 2.5 G/DL (ref 1.5–4.5)
GLUCOSE SERPL-MCNC: 99 MG/DL (ref 65–99)
HCT VFR BLD AUTO: 43 % (ref 34–46.6)
HCV AB S/CO SERPL IA: <0.1 S/CO RATIO (ref 0–0.9)
HDLC SERPL-MCNC: 67 MG/DL
HGB BLD-MCNC: 14.9 G/DL (ref 11.1–15.9)
HIV1 RNA # SERPL NAA+PROBE: <20 COPIES/ML
HIV1 RNA SERPL NAA+PROBE-LOG#: NORMAL LOG10COPY/ML
IMM GRANULOCYTES # BLD AUTO: 0 X10E3/UL (ref 0–0.1)
IMM GRANULOCYTES NFR BLD AUTO: 0 %
LDLC SERPL CALC-MCNC: 140 MG/DL (ref 0–99)
LYMPHOCYTES # BLD AUTO: 1.9 X10E3/UL (ref 0.7–3.1)
LYMPHOCYTES NFR BLD AUTO: 31 %
MCH RBC QN AUTO: 40.8 PG (ref 26.6–33)
MCHC RBC AUTO-ENTMCNC: 34.7 G/DL (ref 31.5–35.7)
MCV RBC AUTO: 118 FL (ref 79–97)
MONOCYTES # BLD AUTO: 0.5 X10E3/UL (ref 0.1–0.9)
MONOCYTES NFR BLD AUTO: 9 %
NEUTROPHILS # BLD AUTO: 3.1 X10E3/UL (ref 1.4–7)
NEUTROPHILS NFR BLD AUTO: 51 %
PLATELET # BLD AUTO: 207 X10E3/UL (ref 150–450)
POTASSIUM SERPL-SCNC: 4.4 MMOL/L (ref 3.5–5.2)
PROT SERPL-MCNC: 6.9 G/DL (ref 6–8.5)
RBC # BLD AUTO: 3.65 X10E6/UL (ref 3.77–5.28)
RPR SER QL: NON REACTIVE
SODIUM SERPL-SCNC: 138 MMOL/L (ref 134–144)
TRIGL SERPL-MCNC: 134 MG/DL (ref 0–149)
VLDLC SERPL CALC-MCNC: 24 MG/DL (ref 5–40)
WBC # BLD AUTO: 6 X10E3/UL (ref 3.4–10.8)

## 2020-11-09 ENCOUNTER — TELEPHONE (OUTPATIENT)
Dept: INFECTIOUS DISEASES | Facility: CLINIC | Age: 60
End: 2020-11-09

## 2020-11-09 NOTE — TELEPHONE ENCOUNTER
----- Message from Cynthia Mesa MD sent at 11/9/2020  9:13 AM CST -----  Please let her know that all of her labs are good except the cholesterol. I encourage her to take the atorvastatin. If she is taking it consistently, she will need to double it. If not, please restart.

## 2020-12-14 ENCOUNTER — OFFICE VISIT (OUTPATIENT)
Dept: INFECTIOUS DISEASES | Facility: CLINIC | Age: 60
End: 2020-12-14
Payer: MEDICARE

## 2020-12-14 VITALS
BODY MASS INDEX: 26.9 KG/M2 | WEIGHT: 171.38 LBS | DIASTOLIC BLOOD PRESSURE: 86 MMHG | HEIGHT: 67 IN | HEART RATE: 88 BPM | TEMPERATURE: 99 F | OXYGEN SATURATION: 99 % | SYSTOLIC BLOOD PRESSURE: 108 MMHG

## 2020-12-14 DIAGNOSIS — E78.5 HYPERLIPIDEMIA, UNSPECIFIED HYPERLIPIDEMIA TYPE: ICD-10-CM

## 2020-12-14 DIAGNOSIS — Z71.89 EDUCATED ABOUT COVID-19 VIRUS INFECTION: ICD-10-CM

## 2020-12-14 DIAGNOSIS — B20 HUMAN IMMUNODEFICIENCY VIRUS (HIV) DISEASE: Primary | ICD-10-CM

## 2020-12-14 DIAGNOSIS — Z79.899 ENCOUNTER FOR LONG-TERM (CURRENT) USE OF MEDICATIONS: ICD-10-CM

## 2020-12-14 PROCEDURE — 99214 OFFICE O/P EST MOD 30 MIN: CPT | Mod: S$GLB,,, | Performed by: INTERNAL MEDICINE

## 2020-12-14 PROCEDURE — 99214 PR OFFICE/OUTPT VISIT, EST, LEVL IV, 30-39 MIN: ICD-10-PCS | Mod: S$GLB,,, | Performed by: INTERNAL MEDICINE

## 2020-12-14 NOTE — PROGRESS NOTES
Subjective:       Patient ID: Tiffani Guerrero is a 60 y.o. female.    Chief Complaint:: HIV Positive/AIDS    HIV Positive/AIDS    19: Been on Biktarvy, which she likes and has not taken the atorvastatin yet  No flu vaccine, and refused in  and .  is undergoing chemotherapy  Feels good, has a hormone pellet in her right hip(she is unclear as to what exactly is in it), getting new pellet today   was diagnosed with metastatic colon cancer and is receiving treatment at [facility]    19:  is doing well on chemotherapy for colon cancer. Can't find the lab from December. She is adherent to cholesterol meds.   The hormone pellet is called bioTE(still don't know what it is in it.) and she feels great. The gyn is following her hormone levels and adjusting the dose. Going to the gym and walking.   Gained 7 pounds. Back pain is under control. She takes lots of vitamins    19:  is very ill, down to 102#, has had bowel obstructions from metastatic cancer. She was receiving thyroid hormone from a holistic doctor and referred herself to an endocrinologist. Lots of labs were done which I reviewed. She is now on synthroid and feels better. She has gained weight. She is adherent to meds but about ot run out. She does not want a flu vaccine, fearing it will make her sick and she has to care for her .     20:   in January. She is doing very well considering. She had gyn follow up and estrogen pellets put into buttock. She is eating healthily. Tolerating meds. Had to go back to combivir sustiva because cost of biktarvy was too great. She still has her baseline medicare and COBRA fro 36 months from her 's job. She has not seen endocrine lately because of COVID but will reschedule. Not taking the lipitor.     20: her gyn is managing her thyroid meds which she stopped taking months ago. She did not see Dr. Melchor again. Gets estrogen pellets every 15  weeks and she is satisfied with them. She just restarted the atorvastatin last week because she was coming in today. She did not get a flu vaccine and does not want it. She professes that she gets sick for a week after each flu vaccine. 2-3 weeks ago she developed some discomfort in the LLQ and took oregano oil which she perceived made it go away.  Having a mammogram in January. UTD with gyn who is also managing her thyroid meds.     Current Outpatient Medications:     ALPRAZolam (XANAX) 0.25 MG tablet, Take 1 tablet (0.25 mg total) by mouth 3 (three) times daily as needed for Anxiety., Disp: 30 tablet, Rfl: 0    atorvastatin (LIPITOR) 20 MG tablet, Take 20 mg by mouth once daily., Disp: , Rfl:     efavirenz (SUSTIVA) 600 mg Tab, Take 600 mg by mouth every evening., Disp: , Rfl:     lamivudine-zidovudine 150-300mg (COMBIVIR) 150-300 mg Tab, Take 1 tablet by mouth every 12 (twelve) hours., Disp: , Rfl:     lidocaine (LIDODERM) 5 %, Place 1 patch onto the skin every 24 hours. Remove & Discard patch within 12 hours or as directed by MD, Disp: , Rfl:     progesterone (PROMETRIUM) 100 MG capsule, Take 100 mg by mouth every evening., Disp: , Rfl: 11    levothyroxine (TIROSINT) 125 mcg Cap, Take 125 mcg by mouth once daily., Disp: 90 capsule, Rfl: 3   Fish oil 2 BID    Review of patient's allergies indicates:   Allergen Reactions    Sulfa (sulfonamide antibiotics)      Past Medical History:   Diagnosis Date    Diverticulitis 11/2017    HIV (human immunodeficiency virus infection) 2003    no complications, santiago<200 in 2003    Hx of migraines     ? Ocular    Hyperlipidemia     MVP (mitral valve prolapse) 2010    Negative ETT, nuclear stress 06/2015, echo w/myxomatous MV    Osteomyelitis of thoracic spine 2003    Enterobacter    Osteopenia 2008    Pyelonephritis 12/10/2012     Past Surgical History:   Procedure Laterality Date    BACK SURGERY  2003    Extensive surgery for thoracic vertebral osteomyelitis  (enterobacter) with spinal cord compression. (decompressive laminectomy T11 with post instrumentation T7-8 and T12-L1    Breast Augmentation Bilateral 1984    With redo 2008.    feet  Bilateral     reconstruction     FOOT SURGERY  1978    Maybe 1979     HYSTERECTOMY  1988    And one ovary for endometriosis    Podiactric surgery  09/2016    and 06/2017 to remove 2 screws    RHIZOTOMY      for back pain    Shoulder Injections  2013    For Frozen shoulders     Social History     Socioeconomic History    Marital status:      Spouse name: Not on file    Number of children: 3    Years of education: Not on file    Highest education level: Not on file   Occupational History    Occupation: Disabled   Social Needs    Financial resource strain: Not on file    Food insecurity     Worry: Not on file     Inability: Not on file    Transportation needs     Medical: Not on file     Non-medical: Not on file   Tobacco Use    Smoking status: Never Smoker    Smokeless tobacco: Never Used   Substance and Sexual Activity    Alcohol use: Yes     Comment: Rarely    Drug use: Not on file    Sexual activity: Not on file   Lifestyle    Physical activity     Days per week: Not on file     Minutes per session: Not on file    Stress: Not on file   Relationships    Social connections     Talks on phone: Not on file     Gets together: Not on file     Attends Mosque service: Not on file     Active member of club or organization: Not on file     Attends meetings of clubs or organizations: Not on file     Relationship status: Not on file   Other Topics Concern    Not on file   Social History Narrative    Not on file     Family History   Problem Relation Age of Onset    Stomach cancer Mother         Metastatic       Travel History: Born in [place], lived in [place], lived in [facility], [place]  Vaccine History: Pneumovax less than 2006, Prevnar 2013, flu vaccine 2012-15, hepatitis A 1, 2 2008, hepatitis B ×3 Menactra  "2018, shinGrix × 2 2018  Advanced Directive:   Safer Sex: Monogamous and protected with   Gyn:  2020  Mammogram: 1/2019  Bone Density: 2011, osteopenia better, 10/2019    Colonoscopy:2008 diverticulosis, 2017 polyps    Review of Systems    Constitutional: No fever, chills, sweats, fatigue, weakness, weight loss.     Eyes: No change in vision, loss of vision, diplopia, photophobia. UTD eye exam    ENT:    no sore throat, mouth pain, or lesions. UTD dental exam    Cardiovascular: No chest pain, BOSS, palpitations or pedal edema    Respiratory: No shortness of breath, BOSS, cough, wheeze, sputum,     Gastrointestinal: No abdominal pain, nausea, vomiting, diarrhea, constipation . No tenderness in the LLQ. Stools are regular and not constipated    Genitourinary: No dysuria,   Good liquid intake    Musculoskeletal: No new pain, joint swelling, or injuries. Occasional back pain for which she no longer takes any pain medicine    Integumentary: No new rashes, lesions, or wounds     Neurological: No dizziness, vertigo, unusual headaches, neuropathy, or falls    Psychiatric: coping exceptionally well with   her 's death, keeping busy. Has a son at home    Endocrine: no diabetes,  Not taking the thyroid hormone and feels tired.     Lymphatic: No lymphadenopathy, blood loss, anemia, or malignancy    Objective:      Blood pressure 108/86, pulse 88, temperature 98.5 °F (36.9 °C), temperature source Temporal, height 5' 7" (1.702 m), weight 77.7 kg (171 lb 6.4 oz), SpO2 99 %. Body mass index is 26.85 kg/m².  Physical Exam      General: Alert and attentive, cooperative and in no distress    Eyes: Pupils equal, round, reactive to light, anicteric, EOMI    Neck: Supple, non-tender, no thyromegaly or masses    ENT: EAC patent, TM normal, nares patent, no oral lesions, teeth in good condition, no thrush    Cardiovascular: Regular rate and rhythm, no murmurs, rubs, or gallop    Respiratory: Lungs clear without wheezes, no " rales, rub or rhonci    Gastrointestinal: Active bowel sounds, soft, no mass or organomegaly, no tenderness or distention    Genitourinary: No flank tenderness    Vascular: No peripheral edema, phlebitis, pulses normal. Warm and well perfused    Musculoskeletal: Ambulates without difficulty, no acute arthritis, synovitis or myositis. Normal muscle bulk and strength. Still reclines and sits up slowly because of her back    Integumentary: Skin without rashes, lesions, or wounds.  Cafe au lait spot on left flank    AnusRectum: per gyn    Neurological: Normal LOC, cranial nerves, speech, reflexes except left AJ, normal gait.  Always moves a little slowly getting up and down from the exam table   Psychiatric: Normal mood, speech, demeanor    Lymphatic: No cervical, supraclavicular, axillary, or inguinal lymphadenopathy      Wound:     HIV Table:   HLA-B 5701     TOXOIgG 8/18/2004 negative   RPR 10/2020 non reactive   HEP A IgG     HBsAg  negative   HBsAb 6/16/2017 non reactive p vax   HBcAb  negative   HBeAb     HBeAg     HCVAb 10/2020 non reactive   HBV DNA     HCV RNA 8/18/2004 negative   Vitamin D 6/16/2017 normal 83   Chlamydia / GC     TB Gold                      12/2019                 negative      Recent Diagnostics:        Assessment and Plan:           Human immunodeficiency virus (HIV) disease  -     CBC Auto Differential; Future; Expected date: 04/14/2021  -     Comprehensive Metabolic Panel; Future; Expected date: 04/14/2021  -     HIV RNA, Quantitative, PCR; Future; Expected date: 04/14/2021  -     CD4 T-[place] Cells; Future; Expected date: 04/14/2021  -     Urinalysis; Future; Expected date: 04/14/2021  -     QuantiFERON-TB Gold Plus; Future; Expected date: 04/14/2021    Hyperlipidemia, unspecified hyperlipidemia type  -     Lipid Panel; Future; Expected date: 04/14/2021    Encounter for long-term (current) use of medications    Educated about COVID-19 virus infection        If you get COVID, notify me  immediately, as you are a candidate for the COVID monoclonal antibody infusion.  Continue to protect yourself from COVID    Same medications  Please continue the atorvastatin   Next lab 4 months with visit    Need to compare with report of bone density from 2017, no copy in system        This note was created using Dragon voice recognition software that occasionally misinterpreted phrases or words.

## 2020-12-14 NOTE — PATIENT INSTRUCTIONS
If you get COVID, notify me immediately, as you are a candidate for the COVID monoclonal antibody infusion.  Continue to protect yourself from COVID    Same medications  Please continue the atorvastatin   Next lab 4 months with visit

## 2021-05-19 ENCOUNTER — OFFICE VISIT (OUTPATIENT)
Dept: INFECTIOUS DISEASES | Facility: CLINIC | Age: 61
End: 2021-05-19
Payer: MEDICARE

## 2021-05-19 VITALS
WEIGHT: 174.63 LBS | HEIGHT: 67 IN | SYSTOLIC BLOOD PRESSURE: 139 MMHG | OXYGEN SATURATION: 100 % | BODY MASS INDEX: 27.41 KG/M2 | DIASTOLIC BLOOD PRESSURE: 68 MMHG | HEART RATE: 87 BPM | TEMPERATURE: 98 F

## 2021-05-19 DIAGNOSIS — Z71.89 EDUCATED ABOUT COVID-19 VIRUS INFECTION: ICD-10-CM

## 2021-05-19 DIAGNOSIS — B20 HUMAN IMMUNODEFICIENCY VIRUS (HIV) DISEASE: Primary | ICD-10-CM

## 2021-05-19 DIAGNOSIS — Z79.899 ENCOUNTER FOR LONG-TERM (CURRENT) USE OF MEDICATIONS: ICD-10-CM

## 2021-05-19 DIAGNOSIS — E78.5 HYPERLIPIDEMIA, UNSPECIFIED HYPERLIPIDEMIA TYPE: ICD-10-CM

## 2021-05-19 PROCEDURE — 99214 PR OFFICE/OUTPT VISIT, EST, LEVL IV, 30-39 MIN: ICD-10-PCS | Mod: S$GLB,,, | Performed by: INTERNAL MEDICINE

## 2021-05-19 PROCEDURE — 99214 OFFICE O/P EST MOD 30 MIN: CPT | Mod: S$GLB,,, | Performed by: INTERNAL MEDICINE

## 2021-05-19 PROCEDURE — 1126F PR PAIN SEVERITY QUANTIFIED, NO PAIN PRESENT: ICD-10-PCS | Mod: S$GLB,,, | Performed by: INTERNAL MEDICINE

## 2021-05-19 PROCEDURE — 1126F AMNT PAIN NOTED NONE PRSNT: CPT | Mod: S$GLB,,, | Performed by: INTERNAL MEDICINE

## 2021-05-19 RX ORDER — LEVOTHYROXINE, LIOTHYRONINE 19; 4.5 UG/1; UG/1
30 TABLET ORAL DAILY
COMMUNITY
Start: 2021-04-26

## 2021-05-19 RX ORDER — LEVOTHYROXINE, LIOTHYRONINE 38; 9 UG/1; UG/1
60 TABLET ORAL DAILY
COMMUNITY
Start: 2021-04-26

## 2021-11-02 LAB
ALBUMIN SERPL-MCNC: 4.3 G/DL (ref 3.8–4.9)
ALBUMIN/GLOB SERPL: 1.9 {RATIO} (ref 1.2–2.2)
ALP SERPL-CCNC: 79 IU/L (ref 44–121)
ALT SERPL-CCNC: 13 IU/L (ref 0–32)
APPEARANCE UR: ABNORMAL
AST SERPL-CCNC: 21 IU/L (ref 0–40)
BACTERIA #/AREA URNS HPF: NORMAL /[HPF]
BASOPHILS # BLD AUTO: 0 X10E3/UL (ref 0–0.2)
BASOPHILS NFR BLD AUTO: 0 %
BILIRUB SERPL-MCNC: 0.5 MG/DL (ref 0–1.2)
BILIRUB UR QL STRIP: NEGATIVE
BUN SERPL-MCNC: 12 MG/DL (ref 8–27)
BUN/CREAT SERPL: 15 (ref 12–28)
CALCIUM SERPL-MCNC: 9.3 MG/DL (ref 8.7–10.3)
CASTS URNS QL MICRO: NORMAL /LPF
CD3+CD4+ CELLS # BLD: 693 /UL (ref 359–1519)
CD3+CD4+ CELLS NFR BLD: 49.5 % (ref 30.8–58.5)
CHLORIDE SERPL-SCNC: 105 MMOL/L (ref 96–106)
CHOLEST SERPL-MCNC: 244 MG/DL (ref 100–199)
CO2 SERPL-SCNC: 19 MMOL/L (ref 20–29)
COLOR UR: ABNORMAL
CREAT SERPL-MCNC: 0.81 MG/DL (ref 0.57–1)
EOSINOPHIL # BLD AUTO: 0.2 X10E3/UL (ref 0–0.4)
EOSINOPHIL NFR BLD AUTO: 4 %
EPI CELLS #/AREA URNS HPF: NORMAL /HPF (ref 0–10)
ERYTHROCYTE [DISTWIDTH] IN BLOOD BY AUTOMATED COUNT: 13.1 % (ref 11.7–15.4)
GAMMA INTERFERON BACKGROUND BLD IA-ACNC: 0.05 IU/ML
GLOBULIN SER CALC-MCNC: 2.3 G/DL (ref 1.5–4.5)
GLUCOSE SERPL-MCNC: 97 MG/DL (ref 65–99)
GLUCOSE UR QL: NEGATIVE
HCT VFR BLD AUTO: 40.3 % (ref 34–46.6)
HDLC SERPL-MCNC: 54 MG/DL
HGB BLD-MCNC: 14 G/DL (ref 11.1–15.9)
HGB UR QL STRIP: NEGATIVE
HIV1 RNA # SERPL NAA+PROBE: <20 COPIES/ML
HIV1 RNA SERPL NAA+PROBE-LOG#: NORMAL LOG10COPY/ML
IMM GRANULOCYTES # BLD AUTO: 0 X10E3/UL (ref 0–0.1)
IMM GRANULOCYTES NFR BLD AUTO: 0 %
KETONES UR QL STRIP: NEGATIVE
LDLC SERPL CALC-MCNC: 160 MG/DL (ref 0–99)
LEUKOCYTE ESTERASE UR QL STRIP: ABNORMAL
LYMPHOCYTES # BLD AUTO: 1.4 X10E3/UL (ref 0.7–3.1)
LYMPHOCYTES NFR BLD AUTO: 28 %
M TB IFN-G BLD-IMP: NEGATIVE
M TB IFN-G CD4+ BCKGRND COR BLD-ACNC: 0.04 IU/ML
MCH RBC QN AUTO: 39.8 PG (ref 26.6–33)
MCHC RBC AUTO-ENTMCNC: 34.7 G/DL (ref 31.5–35.7)
MCV RBC AUTO: 115 FL (ref 79–97)
MICRO URNS: ABNORMAL
MITOGEN IGNF BLD-ACNC: >10 IU/ML
MONOCYTES # BLD AUTO: 0.4 X10E3/UL (ref 0.1–0.9)
MONOCYTES NFR BLD AUTO: 7 %
NEUTROPHILS # BLD AUTO: 3.1 X10E3/UL (ref 1.4–7)
NEUTROPHILS NFR BLD AUTO: 61 %
NITRITE UR QL STRIP: NEGATIVE
PH UR STRIP: 6 [PH] (ref 5–7.5)
PLATELET # BLD AUTO: 280 X10E3/UL (ref 150–450)
POTASSIUM SERPL-SCNC: 4.5 MMOL/L (ref 3.5–5.2)
PROT SERPL-MCNC: 6.6 G/DL (ref 6–8.5)
PROT UR QL STRIP: NEGATIVE
QUANTIFERON TB GOLD (INCUBATED): NORMAL
QUANTIFERON TB2 AG VALUE: 0.05 IU/ML
RBC # BLD AUTO: 3.52 X10E6/UL (ref 3.77–5.28)
RBC #/AREA URNS HPF: NORMAL /HPF (ref 0–2)
SERVICE CMNT-IMP: NORMAL
SODIUM SERPL-SCNC: 138 MMOL/L (ref 134–144)
SP GR UR: 1.02 (ref 1–1.03)
TRIGL SERPL-MCNC: 163 MG/DL (ref 0–149)
UROBILINOGEN UR STRIP-MCNC: 0.2 MG/DL (ref 0.2–1)
VLDLC SERPL CALC-MCNC: 30 MG/DL (ref 5–40)
WBC # BLD AUTO: 5.2 X10E3/UL (ref 3.4–10.8)
WBC #/AREA URNS HPF: NORMAL /HPF (ref 0–5)

## 2021-11-18 ENCOUNTER — OFFICE VISIT (OUTPATIENT)
Dept: INFECTIOUS DISEASES | Facility: CLINIC | Age: 61
End: 2021-11-18
Payer: MEDICARE

## 2021-11-18 VITALS
TEMPERATURE: 98 F | DIASTOLIC BLOOD PRESSURE: 57 MMHG | WEIGHT: 165 LBS | SYSTOLIC BLOOD PRESSURE: 112 MMHG | HEIGHT: 67 IN | OXYGEN SATURATION: 98 % | BODY MASS INDEX: 25.9 KG/M2 | HEART RATE: 97 BPM

## 2021-11-18 DIAGNOSIS — Z71.89 EDUCATED ABOUT COVID-19 VIRUS INFECTION: ICD-10-CM

## 2021-11-18 DIAGNOSIS — E78.5 HYPERLIPIDEMIA, UNSPECIFIED HYPERLIPIDEMIA TYPE: ICD-10-CM

## 2021-11-18 DIAGNOSIS — Z79.899 ENCOUNTER FOR LONG-TERM (CURRENT) USE OF MEDICATIONS: ICD-10-CM

## 2021-11-18 DIAGNOSIS — E78.2 MIXED HYPERLIPIDEMIA: ICD-10-CM

## 2021-11-18 DIAGNOSIS — Z01.84 ENCOUNTER FOR ANTIBODY RESPONSE EXAMINATION: ICD-10-CM

## 2021-11-18 DIAGNOSIS — B20 HUMAN IMMUNODEFICIENCY VIRUS (HIV) DISEASE: Primary | ICD-10-CM

## 2021-11-18 PROCEDURE — 99214 OFFICE O/P EST MOD 30 MIN: CPT | Mod: S$GLB,,, | Performed by: INTERNAL MEDICINE

## 2021-11-18 PROCEDURE — 99214 PR OFFICE/OUTPT VISIT, EST, LEVL IV, 30-39 MIN: ICD-10-PCS | Mod: S$GLB,,, | Performed by: INTERNAL MEDICINE

## 2021-11-18 RX ORDER — UBIDECARENONE 30 MG
30 CAPSULE ORAL 2 TIMES DAILY
COMMUNITY

## 2021-11-18 RX ORDER — ATORVASTATIN CALCIUM 40 MG/1
40 TABLET, FILM COATED ORAL DAILY
Qty: 30 TABLET | Refills: 11 | Status: SHIPPED | OUTPATIENT
Start: 2021-11-18 | End: 2022-11-14

## 2021-11-23 LAB
SARS-COV-2 IGG SERPL IA-ACNC: 16.9 AU/ML
SARS-COV-2 SPIKE AB INTERP: POSITIVE

## 2022-03-05 LAB
ALBUMIN SERPL-MCNC: 4.5 G/DL (ref 3.8–4.8)
ALBUMIN/GLOB SERPL: 2 {RATIO} (ref 1.2–2.2)
ALP SERPL-CCNC: 88 IU/L (ref 44–121)
ALT SERPL-CCNC: 17 IU/L (ref 0–32)
AST SERPL-CCNC: 23 IU/L (ref 0–40)
BILIRUB SERPL-MCNC: 0.5 MG/DL (ref 0–1.2)
BUN SERPL-MCNC: 13 MG/DL (ref 8–27)
BUN/CREAT SERPL: 17 (ref 12–28)
CALCIUM SERPL-MCNC: 9.3 MG/DL (ref 8.7–10.3)
CHLORIDE SERPL-SCNC: 104 MMOL/L (ref 96–106)
CHOLEST SERPL-MCNC: 201 MG/DL (ref 100–199)
CO2 SERPL-SCNC: 23 MMOL/L (ref 20–29)
CREAT SERPL-MCNC: 0.76 MG/DL (ref 0.57–1)
EST. GFR  (NO RACE VARIABLE): 89 ML/MIN/1.73
GLOBULIN SER CALC-MCNC: 2.3 G/DL (ref 1.5–4.5)
GLUCOSE SERPL-MCNC: 124 MG/DL (ref 65–99)
HDLC SERPL-MCNC: 51 MG/DL
LDLC SERPL CALC-MCNC: 106 MG/DL (ref 0–99)
POTASSIUM SERPL-SCNC: 4.3 MMOL/L (ref 3.5–5.2)
PROT SERPL-MCNC: 6.8 G/DL (ref 6–8.5)
SODIUM SERPL-SCNC: 141 MMOL/L (ref 134–144)
TRIGL SERPL-MCNC: 255 MG/DL (ref 0–149)
VLDLC SERPL CALC-MCNC: 44 MG/DL (ref 5–40)

## 2022-05-16 ENCOUNTER — OFFICE VISIT (OUTPATIENT)
Dept: INFECTIOUS DISEASES | Facility: CLINIC | Age: 62
End: 2022-05-16
Payer: MEDICARE

## 2022-05-16 VITALS
TEMPERATURE: 98 F | HEART RATE: 69 BPM | OXYGEN SATURATION: 97 % | SYSTOLIC BLOOD PRESSURE: 122 MMHG | BODY MASS INDEX: 27.21 KG/M2 | DIASTOLIC BLOOD PRESSURE: 62 MMHG | WEIGHT: 173.38 LBS | HEIGHT: 67 IN

## 2022-05-16 DIAGNOSIS — B20 HUMAN IMMUNODEFICIENCY VIRUS (HIV) DISEASE: Primary | ICD-10-CM

## 2022-05-16 DIAGNOSIS — R73.9 HYPERGLYCEMIA: ICD-10-CM

## 2022-05-16 DIAGNOSIS — E03.9 HYPOTHYROIDISM (ACQUIRED): ICD-10-CM

## 2022-05-16 DIAGNOSIS — E78.5 HYPERLIPIDEMIA, UNSPECIFIED HYPERLIPIDEMIA TYPE: ICD-10-CM

## 2022-05-16 DIAGNOSIS — Z79.899 ENCOUNTER FOR LONG-TERM (CURRENT) USE OF MEDICATIONS: ICD-10-CM

## 2022-05-16 PROCEDURE — 1160F RVW MEDS BY RX/DR IN RCRD: CPT | Mod: CPTII,S$GLB,, | Performed by: INTERNAL MEDICINE

## 2022-05-16 PROCEDURE — 1159F PR MEDICATION LIST DOCUMENTED IN MEDICAL RECORD: ICD-10-PCS | Mod: CPTII,S$GLB,, | Performed by: INTERNAL MEDICINE

## 2022-05-16 PROCEDURE — 1160F PR REVIEW ALL MEDS BY PRESCRIBER/CLIN PHARMACIST DOCUMENTED: ICD-10-PCS | Mod: CPTII,S$GLB,, | Performed by: INTERNAL MEDICINE

## 2022-05-16 PROCEDURE — 3078F PR MOST RECENT DIASTOLIC BLOOD PRESSURE < 80 MM HG: ICD-10-PCS | Mod: CPTII,S$GLB,, | Performed by: INTERNAL MEDICINE

## 2022-05-16 PROCEDURE — 3074F SYST BP LT 130 MM HG: CPT | Mod: CPTII,S$GLB,, | Performed by: INTERNAL MEDICINE

## 2022-05-16 PROCEDURE — 3074F PR MOST RECENT SYSTOLIC BLOOD PRESSURE < 130 MM HG: ICD-10-PCS | Mod: CPTII,S$GLB,, | Performed by: INTERNAL MEDICINE

## 2022-05-16 PROCEDURE — 99214 OFFICE O/P EST MOD 30 MIN: CPT | Mod: S$GLB,,, | Performed by: INTERNAL MEDICINE

## 2022-05-16 PROCEDURE — 3078F DIAST BP <80 MM HG: CPT | Mod: CPTII,S$GLB,, | Performed by: INTERNAL MEDICINE

## 2022-05-16 PROCEDURE — 1159F MED LIST DOCD IN RCRD: CPT | Mod: CPTII,S$GLB,, | Performed by: INTERNAL MEDICINE

## 2022-05-16 PROCEDURE — 3008F PR BODY MASS INDEX (BMI) DOCUMENTED: ICD-10-PCS | Mod: CPTII,S$GLB,, | Performed by: INTERNAL MEDICINE

## 2022-05-16 PROCEDURE — 3008F BODY MASS INDEX DOCD: CPT | Mod: CPTII,S$GLB,, | Performed by: INTERNAL MEDICINE

## 2022-05-16 PROCEDURE — 99214 PR OFFICE/OUTPT VISIT, EST, LEVL IV, 30-39 MIN: ICD-10-PCS | Mod: S$GLB,,, | Performed by: INTERNAL MEDICINE

## 2022-05-16 RX ORDER — BICTEGRAVIR SODIUM, EMTRICITABINE, AND TENOFOVIR ALAFENAMIDE FUMARATE 50; 200; 25 MG/1; MG/1; MG/1
1 TABLET ORAL DAILY
COMMUNITY
End: 2022-07-05 | Stop reason: SDUPTHER

## 2022-05-16 NOTE — PATIENT INSTRUCTIONS
Same medications except 2 fish oil twice a day    Colonoscopy this year, please call Dr. Rizzo to schedule this    Lab, fasting this week    Return in 6 months

## 2022-05-16 NOTE — PROGRESS NOTES
HIV Positive/AIDS    19: Been on Biktarvy, which she likes and has not taken the atorvastatin yet  No flu vaccine, and refused in  and .  is undergoing chemotherapy  Feels good, has a hormone pellet in her right hip(she is unclear as to what exactly is in it), getting new pellet today   was diagnosed with metastatic colon cancer and is receiving treatment at [facility]    19:  is doing well on chemotherapy for colon cancer. Can't find the lab from December. She is adherent to cholesterol meds.   The hormone pellet is called bioTE(still don't know what it is in it.) and she feels great. The gyn is following her hormone levels and adjusting the dose. Going to the gym and walking.   Gained 7 pounds. Back pain is under control. She takes lots of vitamins    19:  is very ill, down to 102#, has had bowel obstructions from metastatic cancer. She was receiving thyroid hormone from a holistic doctor and referred herself to an endocrinologist. Lots of labs were done which I reviewed. She is now on synthroid and feels better. She has gained weight. She is adherent to meds but about ot run out. She does not want a flu vaccine, fearing it will make her sick and she has to care for her .     20:   in January. She is doing very well considering. She had gyn follow up and estrogen pellets put into buttock. She is eating healthily. Tolerating meds. Had to go back to combivir sustiva because cost of biktarvy was too great. She still has her baseline medicare and COBRA fro 36 months from her 's job. She has not seen endocrine lately because of COVID but will reschedule. Not taking the lipitor.     20: her gyn is managing her thyroid meds which she stopped taking months ago. She did not see Dr. Melchor again. Gets estrogen pellets every 15 weeks and she is satisfied with them. She just restarted the atorvastatin last week because she was coming in  today. She did not get a flu vaccine and does not want it. She professes that she gets sick for a week after each flu vaccine. 2-3 weeks ago she developed some discomfort in the LLQ and took oregano oil which she perceived made it go away.  Having a mammogram in January. UTD with gyn who is also managing her thyroid meds.     5/19/21: doing great, working part time. Had mammo and was normal and her hormones are at proper levels now. Gyn manages her thyroid and progesterone, and gets estrogen and testosterone pellets injected every 15 weeks. She taking atorvastatin and fish oil. Vitamin D3, K2,zinc,C, drinks mushroom tea daily. 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 She is eating better and works out some. Back has been bothering her more from being on her feet more. Had an illness which is compatible with COVID about 2-3 months ago, and has recovered completely, including sense of smell.     5/16/22: stopped lipitor 2 months ago due to muscle aches. Did not call. She has been on 6 weeks of fish oil 1 bid .she has not been eating healthily however.  She has been on biktarvy for 3 mo.   She is going to be a grandmother, son in [employer] in [place].     Current Outpatient Medications:     lsjwjmhey-paczgleq-jsydtjk ala (BIKTARVY) -25 mg (25 kg or greater), Take 1 tablet by mouth once daily., Disp: , Rfl:     co-enzyme Q-10 30 mg capsule, Take 30 mg by mouth 2 (two) times daily., Disp: , Rfl:     lidocaine (LIDODERM) 5 %, Place 1 patch onto the skin every 24 hours. Remove & Discard patch within 12 hours or as directed by MD, Disp: , Rfl:     NP THYROID 30 mg Tab, Take 30 mg by mouth once daily., Disp: , Rfl:     NP THYROID 60 mg Tab, Take 60 mg by mouth once daily., Disp: , Rfl:     progesterone (PROMETRIUM) 100 MG capsule, Take 100 mg by mouth every evening., Disp: , Rfl: 11    ALPRAZolam (XANAX) 0.25 MG tablet, Take 1 tablet (0.25 mg total) by mouth 3 (three) times  daily as needed for Anxiety., Disp: 30 tablet, Rfl: 0    atorvastatin (LIPITOR) 40 MG tablet, Take 1 tablet (40 mg total) by mouth once daily. (Patient not taking: No sig reported), Disp: 30 tablet, Rfl: 11   Fish oil 2 BID    Review of patient's allergies indicates:   Allergen Reactions    Sulfa (sulfonamide antibiotics)      Past Medical History:   Diagnosis Date    Diverticulitis 11/2017    HIV (human immunodeficiency virus infection) 2003    no complications, santiago<200 in 2003    Hx of migraines     ? Ocular    Hyperlipidemia     MVP (mitral valve prolapse) 2010    Negative ETT, nuclear stress 06/2015, echo w/myxomatous MV    Osteomyelitis of thoracic spine 2003    Enterobacter    Osteopenia 2008    Pyelonephritis 12/10/2012     Past Surgical History:   Procedure Laterality Date    BACK SURGERY  2003    Extensive surgery for thoracic vertebral osteomyelitis (enterobacter) with spinal cord compression. (decompressive laminectomy T11 with post instrumentation T7-8 and T12-L1    Breast Augmentation Bilateral 1984    With redo 2008.    feet  Bilateral     reconstruction     FOOT SURGERY  1978    Maybe 1979     HYSTERECTOMY  1988    And one ovary for endometriosis    Podiactric surgery  09/2016    and 06/2017 to remove 2 screws    RHIZOTOMY      for back pain    Shoulder Injections  2013    For Frozen shoulders     Social History     Socioeconomic History    Marital status:     Number of children: 3   Occupational History    Occupation: Disabled   Tobacco Use    Smoking status: Never Smoker    Smokeless tobacco: Never Used   Substance and Sexual Activity    Alcohol use: Yes     Comment: Rarely     Family History   Problem Relation Age of Onset    Stomach cancer Mother         Metastatic       Travel History: Born in [place], lived in [place], lived in [facility], [place]  Vaccine History: Pneumovax less than 2006, Prevnar 2013, flu vaccine 2012-15, hepatitis A 1, 2 2008, hepatitis B ×3  "Menactra 2018, shinGrix × 2 2018,   Advanced Directive:   Safer Sex:   Gyn:  2022 july  Mammogram: 1/2021  Bone Density: 2011, osteopenia better, 10/2019    Colonoscopy:2008 diverticulosis, 2017 polyps    Review of Systems    Constitutional: No fever, chills, sweats, fatigue, weakness, weight loss.     Eyes: No change in vision, loss of vision, diplopia, photophobia. Not UTD eye exam, wearing readers    ENT:    no sore throat, mouth pain, or lesions. UTD dental exam    Cardiovascular: No chest pain, BOSS, palpitations or pedal edema    Respiratory: No shortness of breath, BOSS, cough, wheeze, sputum,     Gastrointestinal: No abdominal pain, nausea, vomiting, diarrhea, constipation       Genitourinary: No dysuria,   Good liquid intake,    Musculoskeletal: No new pain, joint swelling, or injuries.  mild back pain,   Not requiring any analgesics    Integumentary: No new rashes, lesions, or wounds     Neurological: No dizziness, vertigo, unusual headaches, neuropathy, or falls    Psychiatric: coping exceptionally well with   her 's death, keeping busy,     Endocrine: no diabetes,  Hormone levels are optimized now, per Dr. Brush.     Lymphatic: No lymphadenopathy, blood loss, anemia, or malignancy    Objective:      Blood pressure 122/62, pulse 69, temperature 98.3 °F (36.8 °C), height 5' 7" (1.702 m), weight 78.7 kg (173 lb 6.4 oz), SpO2 97 %. Body mass index is 27.16 kg/m².  Physical Exam      General: Alert and attentive, cooperative and in no distress    Eyes: Pupils equal, round, reactive to light, anicteric, EOMI    Neck: Supple, non-tender, no thyromegaly or masses    ENT: EAC patent, TM normal, nares patent, no oral lesions, teeth in good condition, no thrush    Cardiovascular: Regular rate and rhythm, no murmurs, rubs, or gallop    Respiratory: Lungs clear without wheezes, no rales, rub or rhonci    Gastrointestinal: Active bowel sounds, soft, no mass or organomegaly, no tenderness or " distention    Genitourinary: No flank tenderness    Vascular: No peripheral edema, phlebitis, pulses normal. Warm and well perfused    Musculoskeletal: Ambulates without difficulty, no acute arthritis, synovitis or myositis. Normal muscle bulk and strength. Still reclines and sits up slowly because of her back    Integumentary: Skin without rashes, lesions, or wounds.  Cafe au lait spot on left flank    AnusRectum: per gyn    Neurological: Normal LOC, cranial nerves, speech, reflexes except left AJ, normal gait.      Psychiatric: Normal mood, speech, demeanor    Lymphatic: No cervical, supraclavicular, axillary, or inguinal lymphadenopathy      Wound:     HIV Table:   HLA-B 5701     TOXOIgG 8/18/2004 negative   RPR 10/2020 non reactive   HEP A IgG     HBsAg  negative   HBsAb 6/16/2017 non reactive p vax   HBcAb  negative   HBeAb     HBeAg     HCVAb 10/2020 non reactive   HBV DNA     HCV RNA 8/18/2004 negative   Vitamin D 6/16/2017 normal 83   Chlamydia / GC     TB Gold                      10/29/21               Negative  CD4   10/29/21 683      Recent Diagnostics:        Assessment and Plan:           Human immunodeficiency virus (HIV) disease  -     CBC Auto Differential; Future; Expected date: 05/16/2022  -     Comprehensive Metabolic Panel; Future; Expected date: 05/16/2022  -     HIV RNA, Quantitative, PCR; Future; Expected date: 05/16/2022    Hyperlipidemia, unspecified hyperlipidemia type  -     Lipid Panel; Future; Expected date: 05/16/2022  -     Hemoglobin A1C; Future; Expected date: 05/16/2022    Hypothyroidism (acquired)  -     TSH; Future; Expected date: 05/16/2022    Hyperglycemia   -     Hemoglobin A1C; Future; Expected date: 05/16/2022    Encounter for long-term (current) use of medications          Same medications except 2 fish oil twice a day    Colonoscopy this year, please call Dr. Rizzo to schedule this    Lab, fasting this week    Return in 6 months      This note was created using Dragon  voice recognition software that occasionally misinterpreted phrases or words.

## 2022-07-05 ENCOUNTER — TELEPHONE (OUTPATIENT)
Dept: INFECTIOUS DISEASES | Facility: CLINIC | Age: 62
End: 2022-07-05

## 2022-07-05 DIAGNOSIS — Z21 HIV POSITIVE: Primary | ICD-10-CM

## 2022-07-05 RX ORDER — BICTEGRAVIR SODIUM, EMTRICITABINE, AND TENOFOVIR ALAFENAMIDE FUMARATE 50; 200; 25 MG/1; MG/1; MG/1
1 TABLET ORAL DAILY
Qty: 90 TABLET | Refills: 1 | Status: SHIPPED | OUTPATIENT
Start: 2022-07-05 | End: 2022-12-28 | Stop reason: SDUPTHER

## 2022-08-24 ENCOUNTER — TELEPHONE (OUTPATIENT)
Dept: INFECTIOUS DISEASES | Facility: CLINIC | Age: 62
End: 2022-08-24

## 2022-08-24 ENCOUNTER — PATIENT MESSAGE (OUTPATIENT)
Dept: INFECTIOUS DISEASES | Facility: CLINIC | Age: 62
End: 2022-08-24

## 2022-08-24 NOTE — TELEPHONE ENCOUNTER
Patient called  [phone]    Patient has received a notice for Jury Duty and is requesting  A letter for release of duty due to back issues.    She stated her back has been going out off and on for several weeks.  She is unable to walk or go out for several days at a   Time.    She will try to upload the Jury notice to her chart ; or send it  To be copied / scanned via a friend.     Jury duty is for 9/19/22

## 2022-08-24 NOTE — TELEPHONE ENCOUNTER
Note  Patient called  [phone]     Patient has received a notice for Jury Duty and is requesting  A letter for release of duty due to back issues.     She stated her back has been going out off and on for several weeks.  She is unable to walk or go out for several days at a   Time.     She will try to upload the Jury notice to her chart ; or send it  To be copied / scanned via a friend.      Jury duty is for 9/19/22

## 2022-08-25 NOTE — TELEPHONE ENCOUNTER
I spoke with patient to advise her note was ready   she stated she will come to office to pick it up .  REEMA Wiggins  [facility]  8/25/22

## 2022-11-14 ENCOUNTER — OFFICE VISIT (OUTPATIENT)
Dept: INFECTIOUS DISEASES | Facility: CLINIC | Age: 62
End: 2022-11-14
Payer: MEDICARE

## 2022-11-14 VITALS
TEMPERATURE: 98 F | OXYGEN SATURATION: 98 % | SYSTOLIC BLOOD PRESSURE: 122 MMHG | DIASTOLIC BLOOD PRESSURE: 64 MMHG | BODY MASS INDEX: 27.41 KG/M2 | HEIGHT: 67 IN | HEART RATE: 90 BPM | WEIGHT: 174.63 LBS

## 2022-11-14 DIAGNOSIS — E78.5 HYPERLIPIDEMIA, UNSPECIFIED HYPERLIPIDEMIA TYPE: ICD-10-CM

## 2022-11-14 DIAGNOSIS — Z79.899 ENCOUNTER FOR LONG-TERM (CURRENT) USE OF MEDICATIONS: ICD-10-CM

## 2022-11-14 DIAGNOSIS — B20 HUMAN IMMUNODEFICIENCY VIRUS (HIV) DISEASE: Primary | ICD-10-CM

## 2022-11-14 PROCEDURE — 99214 PR OFFICE/OUTPT VISIT, EST, LEVL IV, 30-39 MIN: ICD-10-PCS | Mod: S$GLB,,, | Performed by: INTERNAL MEDICINE

## 2022-11-14 PROCEDURE — 3078F DIAST BP <80 MM HG: CPT | Mod: CPTII,S$GLB,, | Performed by: INTERNAL MEDICINE

## 2022-11-14 PROCEDURE — 99214 OFFICE O/P EST MOD 30 MIN: CPT | Mod: S$GLB,,, | Performed by: INTERNAL MEDICINE

## 2022-11-14 PROCEDURE — 3078F PR MOST RECENT DIASTOLIC BLOOD PRESSURE < 80 MM HG: ICD-10-PCS | Mod: CPTII,S$GLB,, | Performed by: INTERNAL MEDICINE

## 2022-11-14 PROCEDURE — 3074F PR MOST RECENT SYSTOLIC BLOOD PRESSURE < 130 MM HG: ICD-10-PCS | Mod: CPTII,S$GLB,, | Performed by: INTERNAL MEDICINE

## 2022-11-14 PROCEDURE — 3008F PR BODY MASS INDEX (BMI) DOCUMENTED: ICD-10-PCS | Mod: CPTII,S$GLB,, | Performed by: INTERNAL MEDICINE

## 2022-11-14 PROCEDURE — 1159F PR MEDICATION LIST DOCUMENTED IN MEDICAL RECORD: ICD-10-PCS | Mod: CPTII,S$GLB,, | Performed by: INTERNAL MEDICINE

## 2022-11-14 PROCEDURE — 3008F BODY MASS INDEX DOCD: CPT | Mod: CPTII,S$GLB,, | Performed by: INTERNAL MEDICINE

## 2022-11-14 PROCEDURE — 1159F MED LIST DOCD IN RCRD: CPT | Mod: CPTII,S$GLB,, | Performed by: INTERNAL MEDICINE

## 2022-11-14 PROCEDURE — 1160F PR REVIEW ALL MEDS BY PRESCRIBER/CLIN PHARMACIST DOCUMENTED: ICD-10-PCS | Mod: CPTII,S$GLB,, | Performed by: INTERNAL MEDICINE

## 2022-11-14 PROCEDURE — 3074F SYST BP LT 130 MM HG: CPT | Mod: CPTII,S$GLB,, | Performed by: INTERNAL MEDICINE

## 2022-11-14 PROCEDURE — 1160F RVW MEDS BY RX/DR IN RCRD: CPT | Mod: CPTII,S$GLB,, | Performed by: INTERNAL MEDICINE

## 2022-11-14 RX ORDER — AMOXICILLIN 500 MG
4 CAPSULE ORAL DAILY
COMMUNITY

## 2022-11-14 NOTE — PROGRESS NOTES
Follow-up    HIV Positive/AIDS  19: Been on Biktarvy, which she likes and has not taken the atorvastatin yet  No flu vaccine, and refused in  and .  is undergoing chemotherapy  Feels good, has a hormone pellet in her right hip(she is unclear as to what exactly is in it), getting new pellet today   was diagnosed with metastatic colon cancer and is receiving treatment at [facility]    19:  is doing well on chemotherapy for colon cancer. Can't find the lab from December. She is adherent to cholesterol meds.   The hormone pellet is called bioTE(still don't know what it is in it.) and she feels great. The gyn is following her hormone levels and adjusting the dose. Going to the gym and walking.   Gained 7 pounds. Back pain is under control. She takes lots of vitamins    19:  is very ill, down to 102#, has had bowel obstructions from metastatic cancer. She was receiving thyroid hormone from a holistic doctor and referred herself to an endocrinologist. Lots of labs were done which I reviewed. She is now on synthroid and feels better. She has gained weight. She is adherent to meds but about ot run out. She does not want a flu vaccine, fearing it will make her sick and she has to care for her .     20:   in January. She is doing very well considering. She had gyn follow up and estrogen pellets put into buttock. She is eating healthily. Tolerating meds. Had to go back to combivir sustiva because cost of biktarvy was too great. She still has her baseline medicare and COBRA fro 36 months from her 's job. She has not seen endocrine lately because of COVID but will reschedule. Not taking the lipitor.     20: her gyn is managing her thyroid meds which she stopped taking months ago. She did not see Dr. Melchor again. Gets estrogen pellets every 15 weeks and she is satisfied with them. She just restarted the atorvastatin last week because she was  Received in rad holding from [facility]. Per Dr. Costa hold patient for 15 minutes to monitor. Pt here in rad holding. No complaints of chest pain, SOB, itching, rash or signs of allergic reaction. VSS. Per pt she hasn't had her BP medicine today. /104. Will recycle once patient has relaxed upright in chair for 5 min. No further complaints from patient at this time.    coming in today. She did not get a flu vaccine and does not want it. She professes that she gets sick for a week after each flu vaccine. 2-3 weeks ago she developed some discomfort in the LLQ and took oregano oil which she perceived made it go away.  Having a mammogram in January. UTD with gyn who is also managing her thyroid meds.     5/19/21: doing great, working part time. Had mammo and was normal and her hormones are at proper levels now. Gyn manages her thyroid and progesterone, and gets estrogen and testosterone pellets injected every 15 weeks. She taking atorvastatin and fish oil. Vitamin D3, K2,zinc,C, drinks mushroom tea daily. 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 She is eating better and works out some. Back has been bothering her more from being on her feet more. Had an illness which is compatible with COVID about 2-3 months ago, and has recovered completely, including sense of smell.     5/16/22: stopped lipitor 2 months ago due to muscle aches. Did not call. She has been on 6 weeks of fish oil 1 bid .she has not been eating healthily however.  She has been on biktarvy for 3 mo.   She is going to be a grandmother, son in [employer] in [place].     11/14/22: since last visit, her back was out on a few occasions. Thinks she had the flu, but did not call, 2-3 weeks ago. No fever, bad headache, coughing, with sputum and rhinorrhea. Did not go for lab. Taking DIM supplement, Biote' (estrogen/testosterone) pellets, thyroid po. Declines flu vaccine. Had normal colonoscopy. Mammogram due in January.    Current Outpatient Medications:     czxxfpuiw-ftbdviys-jueyjou ala (BIKTARVY) -25 mg (25 kg or greater), Take 1 tablet by mouth once daily., Disp: 90 tablet, Rfl: 1    co-enzyme Q-10 30 mg capsule, Take 30 mg by mouth 2 (two) times daily., Disp: , Rfl:     lidocaine (LIDODERM) 5 %, Place 1 patch onto the skin every 24 hours. Remove & Discard patch within 12 hours or  as directed by MD, Disp: , Rfl:     NP THYROID 30 mg Tab, Take 30 mg by mouth once daily., Disp: , Rfl:     NP THYROID 60 mg Tab, Take 60 mg by mouth once daily., Disp: , Rfl:     omega-3 fatty acids/fish oil (FISH OIL-OMEGA-3 FATTY ACIDS) 300-1,000 mg capsule, Take 4 capsules by mouth once daily., Disp: , Rfl:     progesterone (PROMETRIUM) 100 MG capsule, Take 100 mg by mouth every evening., Disp: , Rfl: 11    ALPRAZolam (XANAX) 0.25 MG tablet, Take 1 tablet (0.25 mg total) by mouth 3 (three) times daily as needed for Anxiety., Disp: 30 tablet, Rfl: 0    atorvastatin (LIPITOR) 40 MG tablet, Take 1 tablet (40 mg total) by mouth once daily. (Patient not taking: Reported on 5/16/2022), Disp: 30 tablet, Rfl: 11   Fish oil 2 BID    Review of patient's allergies indicates:   Allergen Reactions    Sulfa (sulfonamide antibiotics)      Past Medical History:   Diagnosis Date    Diverticulitis 11/2017    HIV (human immunodeficiency virus infection) 2003    no complications, santiago<200 in 2003    Hx of migraines     ? Ocular    Hyperlipidemia     MVP (mitral valve prolapse) 2010    Negative ETT, nuclear stress 06/2015, echo w/myxomatous MV    Osteomyelitis of thoracic spine 2003    Enterobacter    Osteopenia 2008    Pyelonephritis 12/10/2012     Past Surgical History:   Procedure Laterality Date    BACK SURGERY  2003    Extensive surgery for thoracic vertebral osteomyelitis (enterobacter) with spinal cord compression. (decompressive laminectomy T11 with post instrumentation T7-8 and T12-L1    Breast Augmentation Bilateral 1984    With redo 2008.    feet  Bilateral     reconstruction     FOOT SURGERY  1978    Maybe 1979     HYSTERECTOMY  1988    And one ovary for endometriosis    Podiactric surgery  09/2016    and 06/2017 to remove 2 screws    RHIZOTOMY      for back pain    Shoulder Injections  2013    For Frozen shoulders     Social History     Socioeconomic History    Marital status:     Number of children: 3  "  Occupational History    Occupation: Disabled   Tobacco Use    Smoking status: Never    Smokeless tobacco: Never   Substance and Sexual Activity    Alcohol use: Yes     Comment: Rarely     Family History   Problem Relation Age of Onset    Stomach cancer Mother         Metastatic       Travel History: Born in [place], lived in [place], lived in [facility], [place]  Vaccine History: Pneumovax less than 2006, Prevnar 2013, flu vaccine 2012-15, hepatitis A 1, 2 2008, hepatitis B ×3 Menactra 2018, shingrix × 2 2018,   Advanced Directive:   Safer Sex:   Gyn:  2022 july  Mammogram: 1/2022  Bone Density: 2011, osteopenia better, 10/2019    Colonoscopy:2008 diverticulosis, 2017 polyps, colonoscopy normal 9/2022    Review of Systems    Constitutional: No fever, chills, sweats, fatigue, weakness, weight loss.     Eyes: No change in vision, loss of vision, diplopia, photophobia. Not UTD eye exam, wearing readers    ENT:    no sore throat, mouth pain, or lesions. UTD dental exam    Cardiovascular: No chest pain, BOSS, palpitations or pedal edema    Respiratory: No shortness of breath, BOSS, cough, wheeze, sputum,     Gastrointestinal: No abdominal pain, nausea, vomiting, diarrhea, constipation       Genitourinary: No dysuria,   Good liquid intake,    Musculoskeletal: No new pain, joint swelling, or injuries.  mild back pain, recent flare ups,   Not requiring any analgesics    Integumentary: No new rashes, lesions, or wounds     Neurological: No dizziness, vertigo, unusual headaches, neuropathy, or falls    Psychiatric: coping exceptionally well with   her 's death, keeping busy, going to visit new grandson in a couple of weeks    Endocrine: no diabetes,  Hormone levels are optimized now, per Dr. Brush.     Lymphatic: No lymphadenopathy, blood loss, anemia, or malignancy    Objective:      Blood pressure 122/64, pulse 90, temperature 98 °F (36.7 °C), height 5' 7" (1.702 m), weight 79.2 kg (174 lb 9.6 oz), SpO2 98 %. " Body mass index is 27.35 kg/m².  Physical Exam      General: Alert and attentive, cooperative and in no distress, always pleasant and grateful    Eyes: Pupils equal, round, reactive to light, anicteric, EOMI    Neck: Supple, non-tender, no thyromegaly or masses    ENT: EAC patent, TM normal, nares patent, no oral lesions, teeth in good condition, no thrush    Cardiovascular: Regular rate and rhythm, no murmurs, rubs, or gallop    Respiratory: Lungs clear without wheezes, no rales, rub or rhonci    Gastrointestinal: Active bowel sounds, soft, no mass or organomegaly, no tenderness or distention    Genitourinary: No flank tenderness    Vascular: No peripheral edema, phlebitis, pulses normal. Warm and well perfused    Musculoskeletal: Ambulates without difficulty, no acute arthritis, synovitis or myositis. Normal muscle bulk and strength. Still reclines and sits up slowly because of her back    Integumentary: Skin without rashes, lesions, or wounds.  Cafe au lait spot on left flank    AnusRectum: per gyn    Neurological: Normal LOC, cranial nerves, speech, reflexes except left AJ, normal gait.      Psychiatric: Normal mood, speech, demeanor    Lymphatic: No cervical, supraclavicular, axillary, or inguinal lymphadenopathy      Wound:     HIV Table:   HLA-B 5701     TOXOIgG 8/18/2004 negative   RPR 10/2020 non reactive   HEP A IgG     HBsAg  negative   HBsAb 6/16/2017 non reactive p vax   HBcAb  negative   HBeAb     HBeAg     HCVAb 10/2020 non reactive   HBV DNA     HCV RNA 8/18/2004 negative   Vitamin D 6/16/2017 normal 83   Chlamydia / GC     TB Gold                      10/29/21               Negative  CD4   10/29/21 683      Recent Diagnostics:        Assessment and Plan:           Human immunodeficiency virus (HIV) disease    Hyperlipidemia, unspecified hyperlipidemia type    Encounter for long-term (current) use of medications         Go for lab this week    Same medications      This note was created using Dragon  voice recognition software that occasionally misinterpreted phrases or words.

## 2022-11-20 LAB
ALBUMIN SERPL-MCNC: 4.5 G/DL (ref 3.8–4.8)
ALBUMIN/GLOB SERPL: 1.6 {RATIO} (ref 1.2–2.2)
ALP SERPL-CCNC: 113 IU/L (ref 44–121)
ALT SERPL-CCNC: 16 IU/L (ref 0–32)
AST SERPL-CCNC: 22 IU/L (ref 0–40)
BASOPHILS # BLD AUTO: 0 X10E3/UL (ref 0–0.2)
BASOPHILS NFR BLD AUTO: 1 %
BILIRUB SERPL-MCNC: 0.4 MG/DL (ref 0–1.2)
BUN SERPL-MCNC: 14 MG/DL (ref 8–27)
BUN/CREAT SERPL: 15 (ref 12–28)
CALCIUM SERPL-MCNC: 9.8 MG/DL (ref 8.7–10.3)
CHLORIDE SERPL-SCNC: 99 MMOL/L (ref 96–106)
CHOLEST SERPL-MCNC: 213 MG/DL (ref 100–199)
CO2 SERPL-SCNC: 22 MMOL/L (ref 20–29)
CREAT SERPL-MCNC: 0.96 MG/DL (ref 0.57–1)
EOSINOPHIL # BLD AUTO: 0.2 X10E3/UL (ref 0–0.4)
EOSINOPHIL NFR BLD AUTO: 3 %
ERYTHROCYTE [DISTWIDTH] IN BLOOD BY AUTOMATED COUNT: 12.5 % (ref 11.7–15.4)
EST. GFR  (NO RACE VARIABLE): 67 ML/MIN/1.73
GLOBULIN SER CALC-MCNC: 2.9 G/DL (ref 1.5–4.5)
GLUCOSE SERPL-MCNC: 100 MG/DL (ref 70–99)
HBA1C MFR BLD: 5.5 % (ref 4.8–5.6)
HCT VFR BLD AUTO: 46.5 % (ref 34–46.6)
HDLC SERPL-MCNC: 43 MG/DL
HGB BLD-MCNC: 15.5 G/DL (ref 11.1–15.9)
HIV1 RNA # SERPL NAA+PROBE: <20 COPIES/ML
HIV1 RNA SERPL NAA+PROBE-LOG#: NORMAL LOG10COPY/ML
IMM GRANULOCYTES # BLD AUTO: 0 X10E3/UL (ref 0–0.1)
IMM GRANULOCYTES NFR BLD AUTO: 0 %
LDLC SERPL CALC-MCNC: 145 MG/DL (ref 0–99)
LYMPHOCYTES # BLD AUTO: 2 X10E3/UL (ref 0.7–3.1)
LYMPHOCYTES NFR BLD AUTO: 30 %
MCH RBC QN AUTO: 32.3 PG (ref 26.6–33)
MCHC RBC AUTO-ENTMCNC: 33.3 G/DL (ref 31.5–35.7)
MCV RBC AUTO: 97 FL (ref 79–97)
MONOCYTES # BLD AUTO: 0.7 X10E3/UL (ref 0.1–0.9)
MONOCYTES NFR BLD AUTO: 11 %
NEUTROPHILS # BLD AUTO: 3.8 X10E3/UL (ref 1.4–7)
NEUTROPHILS NFR BLD AUTO: 55 %
PLATELET # BLD AUTO: 314 X10E3/UL (ref 150–450)
POTASSIUM SERPL-SCNC: 4.5 MMOL/L (ref 3.5–5.2)
PROT SERPL-MCNC: 7.4 G/DL (ref 6–8.5)
RBC # BLD AUTO: 4.8 X10E6/UL (ref 3.77–5.28)
SODIUM SERPL-SCNC: 136 MMOL/L (ref 134–144)
TRIGL SERPL-MCNC: 137 MG/DL (ref 0–149)
TSH SERPL DL<=0.005 MIU/L-ACNC: 0.63 UIU/ML (ref 0.45–4.5)
VLDLC SERPL CALC-MCNC: 25 MG/DL (ref 5–40)
WBC # BLD AUTO: 6.7 X10E3/UL (ref 3.4–10.8)

## 2022-12-23 ENCOUNTER — PATIENT MESSAGE (OUTPATIENT)
Dept: INFECTIOUS DISEASES | Facility: HOSPITAL | Age: 62
End: 2022-12-23

## 2022-12-23 ENCOUNTER — TELEPHONE (OUTPATIENT)
Dept: INFECTIOUS DISEASES | Facility: CLINIC | Age: 62
End: 2022-12-23

## 2022-12-28 DIAGNOSIS — Z21 HIV POSITIVE: Primary | ICD-10-CM

## 2022-12-28 RX ORDER — BICTEGRAVIR SODIUM, EMTRICITABINE, AND TENOFOVIR ALAFENAMIDE FUMARATE 50; 200; 25 MG/1; MG/1; MG/1
1 TABLET ORAL DAILY
Qty: 90 TABLET | Refills: 1 | Status: SHIPPED | OUTPATIENT
Start: 2022-12-28 | End: 2023-06-29

## 2023-05-15 ENCOUNTER — OFFICE VISIT (OUTPATIENT)
Dept: INFECTIOUS DISEASES | Facility: CLINIC | Age: 63
End: 2023-05-15
Payer: MEDICARE

## 2023-05-15 VITALS
SYSTOLIC BLOOD PRESSURE: 124 MMHG | BODY MASS INDEX: 27.25 KG/M2 | HEART RATE: 80 BPM | DIASTOLIC BLOOD PRESSURE: 66 MMHG | OXYGEN SATURATION: 96 % | HEIGHT: 67 IN | TEMPERATURE: 98 F | WEIGHT: 173.63 LBS

## 2023-05-15 DIAGNOSIS — Z79.899 ENCOUNTER FOR LONG-TERM (CURRENT) USE OF MEDICATIONS: ICD-10-CM

## 2023-05-15 DIAGNOSIS — B20 HUMAN IMMUNODEFICIENCY VIRUS (HIV) DISEASE: Primary | ICD-10-CM

## 2023-05-15 DIAGNOSIS — E03.9 HYPOTHYROIDISM (ACQUIRED): ICD-10-CM

## 2023-05-15 DIAGNOSIS — E78.5 HYPERLIPIDEMIA, UNSPECIFIED HYPERLIPIDEMIA TYPE: ICD-10-CM

## 2023-05-15 PROCEDURE — 3008F BODY MASS INDEX DOCD: CPT | Mod: CPTII,S$GLB,, | Performed by: INTERNAL MEDICINE

## 2023-05-15 PROCEDURE — 1159F MED LIST DOCD IN RCRD: CPT | Mod: CPTII,S$GLB,, | Performed by: INTERNAL MEDICINE

## 2023-05-15 PROCEDURE — 3074F SYST BP LT 130 MM HG: CPT | Mod: CPTII,S$GLB,, | Performed by: INTERNAL MEDICINE

## 2023-05-15 PROCEDURE — 3008F PR BODY MASS INDEX (BMI) DOCUMENTED: ICD-10-PCS | Mod: CPTII,S$GLB,, | Performed by: INTERNAL MEDICINE

## 2023-05-15 PROCEDURE — 1160F RVW MEDS BY RX/DR IN RCRD: CPT | Mod: CPTII,S$GLB,, | Performed by: INTERNAL MEDICINE

## 2023-05-15 PROCEDURE — 3078F DIAST BP <80 MM HG: CPT | Mod: CPTII,S$GLB,, | Performed by: INTERNAL MEDICINE

## 2023-05-15 PROCEDURE — 1160F PR REVIEW ALL MEDS BY PRESCRIBER/CLIN PHARMACIST DOCUMENTED: ICD-10-PCS | Mod: CPTII,S$GLB,, | Performed by: INTERNAL MEDICINE

## 2023-05-15 PROCEDURE — 1159F PR MEDICATION LIST DOCUMENTED IN MEDICAL RECORD: ICD-10-PCS | Mod: CPTII,S$GLB,, | Performed by: INTERNAL MEDICINE

## 2023-05-15 PROCEDURE — 3074F PR MOST RECENT SYSTOLIC BLOOD PRESSURE < 130 MM HG: ICD-10-PCS | Mod: CPTII,S$GLB,, | Performed by: INTERNAL MEDICINE

## 2023-05-15 PROCEDURE — 99214 PR OFFICE/OUTPT VISIT, EST, LEVL IV, 30-39 MIN: ICD-10-PCS | Mod: S$GLB,,, | Performed by: INTERNAL MEDICINE

## 2023-05-15 PROCEDURE — 3078F PR MOST RECENT DIASTOLIC BLOOD PRESSURE < 80 MM HG: ICD-10-PCS | Mod: CPTII,S$GLB,, | Performed by: INTERNAL MEDICINE

## 2023-05-15 PROCEDURE — 99214 OFFICE O/P EST MOD 30 MIN: CPT | Mod: S$GLB,,, | Performed by: INTERNAL MEDICINE

## 2023-05-15 NOTE — PATIENT INSTRUCTIONS
Please establish with a primary care provider, I would look through the [facility] provider network    Same medications    Lab, fasting     Return in 6 months

## 2023-05-15 NOTE — PROGRESS NOTES
Follow-up    HIV Positive/AIDS  19: Been on Biktarvy, which she likes and has not taken the atorvastatin yet  No flu vaccine, and refused in  and .  is undergoing chemotherapy  Feels good, has a hormone pellet in her right hip(she is unclear as to what exactly is in it), getting new pellet today   was diagnosed with metastatic colon cancer and is receiving treatment at [facility]    19:  is doing well on chemotherapy for colon cancer. Can't find the lab from December. She is adherent to cholesterol meds.   The hormone pellet is called bioTE(still don't know what it is in it.) and she feels great. The gyn is following her hormone levels and adjusting the dose. Going to the gym and walking.   Gained 7 pounds. Back pain is under control. She takes lots of vitamins    19:  is very ill, down to 102#, has had bowel obstructions from metastatic cancer. She was receiving thyroid hormone from a holistic doctor and referred herself to an endocrinologist. Lots of labs were done which I reviewed. She is now on synthroid and feels better. She has gained weight. She is adherent to meds but about ot run out. She does not want a flu vaccine, fearing it will make her sick and she has to care for her .     20:   in January. She is doing very well considering. She had gyn follow up and estrogen pellets put into buttock. She is eating healthily. Tolerating meds. Had to go back to combivir sustiva because cost of biktarvy was too great. She still has her baseline medicare and COBRA fro 36 months from her 's job. She has not seen endocrine lately because of COVID but will reschedule. Not taking the lipitor.     20: her gyn is managing her thyroid meds which she stopped taking months ago. She did not see Dr. Melchor again. Gets estrogen pellets every 15 weeks and she is satisfied with them. She just restarted the atorvastatin last week because she was  coming in today. She did not get a flu vaccine and does not want it. She professes that she gets sick for a week after each flu vaccine. 2-3 weeks ago she developed some discomfort in the LLQ and took oregano oil which she perceived made it go away.  Having a mammogram in January. UTD with gyn who is also managing her thyroid meds.     5/19/21: doing great, working part time. Had mammo and was normal and her hormones are at proper levels now. Gyn manages her thyroid and progesterone, and gets estrogen and testosterone pellets injected every 15 weeks. She taking atorvastatin and fish oil. Vitamin D3, K2,zinc,C, drinks mushroom tea daily. 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 She is eating better and works out some. Back has been bothering her more from being on her feet more. Had an illness which is compatible with COVID about 2-3 months ago, and has recovered completely, including sense of smell.     5/16/22: stopped lipitor 2 months ago due to muscle aches. Did not call. She has been on 6 weeks of fish oil 1 bid .she has not been eating healthily however.  She has been on biktarvy for 3 mo.   She is going to be a grandmother, son in [employer] in [place].     11/14/22: since last visit, her back was out on a few occasions. Thinks she had the flu, but did not call, 2-3 weeks ago. No fever, bad headache, coughing, with sputum and rhinorrhea. Did not go for lab. Taking DIM supplement, Biote' (estrogen/testosterone) pellets, thyroid po. Declines flu vaccine. Had normal colonoscopy. Mammogram due in January.    5/15/23: going to have re-do breast augmentation this week, per Dr. Somers, at his surgical suite. She is up to date on her mammogram 2/2023. She is doing well in general. She lost her 14 year old dog to dementia/illness in the last few days.     Current Outpatient Medications:     ijersqdfw-dwhuzrly-dwoscpy ala (BIKTARVY) -25 mg (25 kg or greater), Take 1 tablet  by mouth once daily., Disp: 90 tablet, Rfl: 1    co-enzyme Q-10 30 mg capsule, Take 30 mg by mouth 2 (two) times daily., Disp: , Rfl:     lidocaine (LIDODERM) 5 %, Place 1 patch onto the skin every 24 hours. Remove & Discard patch within 12 hours or as directed by MD, Disp: , Rfl:     NP THYROID 30 mg Tab, Take 30 mg by mouth once daily., Disp: , Rfl:     NP THYROID 60 mg Tab, Take 60 mg by mouth once daily., Disp: , Rfl:     omega-3 fatty acids/fish oil (FISH OIL-OMEGA-3 FATTY ACIDS) 300-1,000 mg capsule, Take 4 capsules by mouth once daily., Disp: , Rfl:     progesterone (PROMETRIUM) 100 MG capsule, Take 100 mg by mouth every evening., Disp: , Rfl: 11    ALPRAZolam (XANAX) 0.25 MG tablet, Take 1 tablet (0.25 mg total) by mouth 3 (three) times daily as needed for Anxiety., Disp: 30 tablet, Rfl: 0   Fish oil 2 BID    Review of patient's allergies indicates:   Allergen Reactions    Sulfa (sulfonamide antibiotics)      Past Medical History:   Diagnosis Date    Diverticulitis 11/2017    HIV (human immunodeficiency virus infection) 2003    no complications, santiago<200 in 2003    Hx of migraines     ? Ocular    Hyperlipidemia     MVP (mitral valve prolapse) 2010    Negative ETT, nuclear stress 06/2015, echo w/myxomatous MV    Osteomyelitis of thoracic spine 2003    Enterobacter    Osteopenia 2008    Pyelonephritis 12/10/2012     Past Surgical History:   Procedure Laterality Date    BACK SURGERY  2003    Extensive surgery for thoracic vertebral osteomyelitis (enterobacter) with spinal cord compression. (decompressive laminectomy T11 with post instrumentation T7-8 and T12-L1    Breast Augmentation Bilateral 1984    With redo 2008.    feet  Bilateral     reconstruction     FOOT SURGERY  1978    Maybe 1979     HYSTERECTOMY  1988    And one ovary for endometriosis    Podiactric surgery  09/2016    and 06/2017 to remove 2 screws    RHIZOTOMY      for back pain    Shoulder Injections  2013    For Frozen shoulders     Social  "History     Socioeconomic History    Marital status:     Number of children: 3   Occupational History    Occupation: Disabled   Tobacco Use    Smoking status: Never    Smokeless tobacco: Never   Substance and Sexual Activity    Alcohol use: Yes     Comment: Rarely     Family History   Problem Relation Age of Onset    Stomach cancer Mother         Metastatic       Travel History: Born in [place], lived in [place], lived in [facility] [place], [place]  Vaccine History: Pneumovax less than 2006, Prevnar 2013, flu vaccine 2012-15, hepatitis A 1, 2 2008, hepatitis B ×3 Menactra 2018, shingrix × 2 2018, (prefers not to have any further vaccines)  Advanced Directive:   Safer Sex:   Gyn:  2022 july  Mammogram: 1/2022, 2/2023  Bone Density: 2011, osteopenia better, 10/2019    Colonoscopy:2008 diverticulosis, 2017 polyps, colonoscopy normal 9/2022    Review of Systems    Constitutional: No fever, chills, sweats, fatigue, weakness, weight loss.     Eyes: No change in vision, loss of vision, diplopia, photophobia. Not UTD eye exam, wearing readers    ENT:    no sore throat, mouth pain, or lesions. UTD dental exam    Cardiovascular: No chest pain, BOSS, palpitations or pedal edema    Respiratory: No shortness of breath, BOSS, cough, wheeze, sputum,     Gastrointestinal: No abdominal pain, nausea, vomiting, diarrhea, constipation       Genitourinary: No dysuria,   Good liquid intake,    Musculoskeletal: No new pain, joint swelling, or injuries.      Integumentary: No new rashes, lesions, or wounds     Neurological: No dizziness, vertigo, unusual headaches, neuropathy, or falls    Psychiatric: her 14 year old dog reached end of life last week and she is grieving this    Endocrine: no diabetes,  Hormone levels are optimized now, per Dr. Brush.     Lymphatic: No lymphadenopathy, blood loss, anemia, or malignancy    Objective:      Blood pressure 124/66, pulse 80, temperature 98.1 °F (36.7 °C), height 5' 7" (1.702 m), weight 78.7 kg " (173 lb 9.6 oz), SpO2 96 %. Body mass index is 27.19 kg/m².  Physical Exam      General: Alert and attentive, cooperative and in no distress, always pleasant and grateful    Eyes: Pupils equal, round, reactive to light, anicteric, EOMI    Neck: Supple, non-tender, no thyromegaly or masses    ENT: EAC patent, TM normal, nares patent, no oral lesions, teeth in good condition, no thrush    Cardiovascular: Regular rate and rhythm, no murmurs, rubs, or gallop    Respiratory: Lungs clear without wheezes, no rales, rub or rhonci    Gastrointestinal: Active bowel sounds, soft, no mass or organomegaly, no tenderness or distention    Genitourinary: No flank tenderness    Vascular: No peripheral edema, phlebitis, pulses normal. Warm and well perfused    Musculoskeletal: Ambulates without difficulty, no acute arthritis, synovitis or myositis. Normal muscle bulk and strength. Still reclines and sits up slowly because of her back    Integumentary: Skin without rashes, lesions, or wounds.  Cafe au lait spot on left flank    AnusRectum: per gyn    Neurological: Normal LOC, cranial nerves, speech, reflexes except left AJ, normal gait.      Psychiatric: Normal mood, speech, demeanor    Lymphatic: No cervical, supraclavicular, axillary, or inguinal lymphadenopathy      Wound:     HIV Table:   HLA-B 5701     TOXOIgG 8/18/2004 negative   RPR 10/2020 non reactive   HEP A IgG     HBsAg  negative   HBsAb 6/16/2017 non reactive p vax   HBcAb  negative   HBeAb     HBeAg     HCVAb 10/2020 non reactive   HBV DNA     HCV RNA 8/18/2004 negative   Vitamin D 6/16/2017 normal 83   Chlamydia / GC     TB Gold                      10/29/21               Negative  CD4   10/29/21 683      Recent Diagnostics:        Assessment and Plan:           Human immunodeficiency virus (HIV) disease  -     CBC Auto Differential; Future; Expected date: 05/15/2023  -     Comprehensive Metabolic Panel; Future; Expected date: 05/15/2023  -     HIV RNA, Quantitative,  PCR; Future; Expected date: 05/15/2023  -     QuantiFERON-TB Gold Plus; Future; Expected date: 05/15/2023  -     Hepatitis C Antibody; Future; Expected date: 05/15/2023  -     RPR; Future; Expected date: 05/15/2023  -     Urinalysis; Future; Expected date: 05/15/2023  -     CD4 T-[place] Cells; Future; Expected date: 05/15/2023    Hyperlipidemia, unspecified hyperlipidemia type  -     Lipid Panel; Future; Expected date: 05/15/2023    Encounter for long-term (current) use of medications    Hypothyroidism (acquired)    Please establish with a primary care provider, I would look through the [facility] provider network    Same medications    Lab, fasting     Return in 6 months        Refused prevnar 20, and really does not want any other vaccines      This note was created using Dragon voice recognition software that occasionally misinterpreted phrases or words.

## 2023-06-29 DIAGNOSIS — Z21 HIV POSITIVE: ICD-10-CM

## 2023-06-29 RX ORDER — BICTEGRAVIR SODIUM, EMTRICITABINE, AND TENOFOVIR ALAFENAMIDE FUMARATE 50; 200; 25 MG/1; MG/1; MG/1
TABLET ORAL
Qty: 30 TABLET | Refills: 5 | Status: SHIPPED | OUTPATIENT
Start: 2023-06-29 | End: 2023-11-13 | Stop reason: SDUPTHER

## 2023-11-13 ENCOUNTER — OFFICE VISIT (OUTPATIENT)
Dept: INFECTIOUS DISEASES | Facility: CLINIC | Age: 63
End: 2023-11-13
Payer: MEDICARE

## 2023-11-13 VITALS
DIASTOLIC BLOOD PRESSURE: 80 MMHG | BODY MASS INDEX: 28.04 KG/M2 | WEIGHT: 179 LBS | OXYGEN SATURATION: 98 % | SYSTOLIC BLOOD PRESSURE: 123 MMHG | HEART RATE: 78 BPM

## 2023-11-13 DIAGNOSIS — Z21 HIV POSITIVE: ICD-10-CM

## 2023-11-13 DIAGNOSIS — E78.2 MIXED HYPERLIPIDEMIA: ICD-10-CM

## 2023-11-13 DIAGNOSIS — B20 HUMAN IMMUNODEFICIENCY VIRUS (HIV) DISEASE: Primary | ICD-10-CM

## 2023-11-13 DIAGNOSIS — E78.5 HYPERLIPIDEMIA, UNSPECIFIED HYPERLIPIDEMIA TYPE: ICD-10-CM

## 2023-11-13 DIAGNOSIS — Z79.899 ENCOUNTER FOR LONG-TERM (CURRENT) USE OF MEDICATIONS: ICD-10-CM

## 2023-11-13 PROCEDURE — 1159F PR MEDICATION LIST DOCUMENTED IN MEDICAL RECORD: ICD-10-PCS | Mod: CPTII,S$GLB,, | Performed by: INTERNAL MEDICINE

## 2023-11-13 PROCEDURE — 1160F RVW MEDS BY RX/DR IN RCRD: CPT | Mod: CPTII,S$GLB,, | Performed by: INTERNAL MEDICINE

## 2023-11-13 PROCEDURE — 3079F DIAST BP 80-89 MM HG: CPT | Mod: CPTII,S$GLB,, | Performed by: INTERNAL MEDICINE

## 2023-11-13 PROCEDURE — 3008F PR BODY MASS INDEX (BMI) DOCUMENTED: ICD-10-PCS | Mod: CPTII,S$GLB,, | Performed by: INTERNAL MEDICINE

## 2023-11-13 PROCEDURE — 3074F PR MOST RECENT SYSTOLIC BLOOD PRESSURE < 130 MM HG: ICD-10-PCS | Mod: CPTII,S$GLB,, | Performed by: INTERNAL MEDICINE

## 2023-11-13 PROCEDURE — 99214 PR OFFICE/OUTPT VISIT, EST, LEVL IV, 30-39 MIN: ICD-10-PCS | Mod: S$GLB,,, | Performed by: INTERNAL MEDICINE

## 2023-11-13 PROCEDURE — 3074F SYST BP LT 130 MM HG: CPT | Mod: CPTII,S$GLB,, | Performed by: INTERNAL MEDICINE

## 2023-11-13 PROCEDURE — 3008F BODY MASS INDEX DOCD: CPT | Mod: CPTII,S$GLB,, | Performed by: INTERNAL MEDICINE

## 2023-11-13 PROCEDURE — 1160F PR REVIEW ALL MEDS BY PRESCRIBER/CLIN PHARMACIST DOCUMENTED: ICD-10-PCS | Mod: CPTII,S$GLB,, | Performed by: INTERNAL MEDICINE

## 2023-11-13 PROCEDURE — 3079F PR MOST RECENT DIASTOLIC BLOOD PRESSURE 80-89 MM HG: ICD-10-PCS | Mod: CPTII,S$GLB,, | Performed by: INTERNAL MEDICINE

## 2023-11-13 PROCEDURE — 1159F MED LIST DOCD IN RCRD: CPT | Mod: CPTII,S$GLB,, | Performed by: INTERNAL MEDICINE

## 2023-11-13 PROCEDURE — 99214 OFFICE O/P EST MOD 30 MIN: CPT | Mod: S$GLB,,, | Performed by: INTERNAL MEDICINE

## 2023-11-13 RX ORDER — ZINC SULFATE 50(220)MG
220 CAPSULE ORAL 3 TIMES DAILY
COMMUNITY

## 2023-11-13 RX ORDER — PROMETHAZINE HYDROCHLORIDE 12.5 MG/1
12.5 TABLET ORAL
COMMUNITY
Start: 2023-05-19

## 2023-11-13 RX ORDER — CHOLECALCIFEROL (VITAMIN D3) 25 MCG
5000 TABLET ORAL DAILY
COMMUNITY

## 2023-11-13 RX ORDER — SELENIUM 50 MCG
200 TABLET ORAL DAILY
COMMUNITY

## 2023-11-13 RX ORDER — BICTEGRAVIR SODIUM, EMTRICITABINE, AND TENOFOVIR ALAFENAMIDE FUMARATE 50; 200; 25 MG/1; MG/1; MG/1
1 TABLET ORAL DAILY
Qty: 90 TABLET | Refills: 1 | Status: SHIPPED | OUTPATIENT
Start: 2023-11-13 | End: 2024-05-11

## 2023-11-13 NOTE — PATIENT INSTRUCTIONS
Go to the lab tomorrow please  Continue biktarvy    Please establish with a primary care doctor prior to January 7    Your next visit will be with Dr. Damari Olivares

## 2023-11-13 NOTE — PROGRESS NOTES
Follow-up    HIV Positive/AIDS    19: Been on Biktarvy, which she likes and has not taken the atorvastatin yet  No flu vaccine, and refused in  and .  is undergoing chemotherapy  Feels good, has a hormone pellet in her right hip(she is unclear as to what exactly is in it), getting new pellet today   was diagnosed with metastatic colon cancer and is receiving treatment at [facility]    19:  is doing well on chemotherapy for colon cancer. Can't find the lab from December. She is adherent to cholesterol meds.   The hormone pellet is called bioTE(still don't know what it is in it.) and she feels great. The gyn is following her hormone levels and adjusting the dose. Going to the gym and walking.   Gained 7 pounds. Back pain is under control. She takes lots of vitamins    19:  is very ill, down to 102#, has had bowel obstructions from metastatic cancer. She was receiving thyroid hormone from a holistic doctor and referred herself to an endocrinologist. Lots of labs were done which I reviewed. She is now on synthroid and feels better. She has gained weight. She is adherent to meds but about ot run out. She does not want a flu vaccine, fearing it will make her sick and she has to care for her .     20:   in January. She is doing very well considering. She had gyn follow up and estrogen pellets put into buttock. She is eating healthily. Tolerating meds. Had to go back to combivir sustiva because cost of biktarvy was too great. She still has her baseline medicare and COBRA fro 36 months from her 's job. She has not seen endocrine lately because of COVID but will reschedule. Not taking the lipitor.     20: her gyn is managing her thyroid meds which she stopped taking months ago. She did not see Dr. Melchor again. Gets estrogen pellets every 15 weeks and she is satisfied with them. She just restarted the atorvastatin last week because she  was coming in today. She did not get a flu vaccine and does not want it. She professes that she gets sick for a week after each flu vaccine. 2-3 weeks ago she developed some discomfort in the LLQ and took oregano oil which she perceived made it go away.  Having a mammogram in January. UTD with gyn who is also managing her thyroid meds.     5/19/21: doing great, working part time. Had mammo and was normal and her hormones are at proper levels now. Gyn manages her thyroid and progesterone, and gets estrogen and testosterone pellets injected every 15 weeks. She taking atorvastatin and fish oil. Vitamin D3, K2,zinc,C, drinks mushroom tea daily. 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 She is eating better and works out some. Back has been bothering her more from being on her feet more. Had an illness which is compatible with COVID about 2-3 months ago, and has recovered completely, including sense of smell.     5/16/22: stopped lipitor 2 months ago due to muscle aches. Did not call. She has been on 6 weeks of fish oil 1 bid .she has not been eating healthily however.  She has been on biktarvy for 3 mo.   She is going to be a grandmother, son in [employer] in [place].     11/14/22: since last visit, her back was out on a few occasions. Thinks she had the flu, but did not call, 2-3 weeks ago. No fever, bad headache, coughing, with sputum and rhinorrhea. Did not go for lab. Taking DIM supplement, Biote' (estrogen/testosterone) pellets, thyroid po. Declines flu vaccine. Had normal colonoscopy. Mammogram due in January.    5/15/23: going to have re-do breast augmentation this week, per Dr. Somers, at his surgical suite. She is up to date on her mammogram 2/2023. She is doing well in general. She lost her 14 year old dog to dementia/illness in the last few days.     11/13/23:last visit after over 15 years of care(records in [facility] and paper chart).    doing well. On biktarvy since  2018. Now getting meds from [facility]. Dr. Brush is managing her thyroid. Had breast augmentation to reduce volume and not pleased with them. Silicone was replaced by saline. She feels better however. Did not have labs since last November.   She was in the [employer] and her son is a chief escal[employer] officer now.     Current Outpatient Medications:     co-enzyme Q-10 30 mg capsule, Take 30 mg by mouth 2 (two) times daily., Disp: , Rfl:     lidocaine (LIDODERM) 5 %, Place 1 patch onto the skin every 24 hours. Remove & Discard patch within 12 hours or as directed by MD, Disp: , Rfl:     NP THYROID 30 mg Tab, Take 30 mg by mouth once daily., Disp: , Rfl:     NP THYROID 60 mg Tab, Take 60 mg by mouth once daily., Disp: , Rfl:     omega-3 fatty acids/fish oil (FISH OIL-OMEGA-3 FATTY ACIDS) 300-1,000 mg capsule, Take 4 capsules by mouth once daily., Disp: , Rfl:     progesterone (PROMETRIUM) 100 MG capsule, Take 100 mg by mouth every evening., Disp: , Rfl: 11    selenium 50 mcg Tab, Take 200 mcg by mouth once daily., Disp: , Rfl:     vitamin D (VITAMIN D3) 1000 units Tab, Take 5,000 Units by mouth once daily. drops, Disp: , Rfl:     zinc sulfate (ZINCATE) 50 mg zinc (220 mg) capsule, Take 220 mg by mouth 3 (three) times daily. Dosage unconfirmed, Disp: , Rfl:     ALPRAZolam (XANAX) 0.25 MG tablet, Take 1 tablet (0.25 mg total) by mouth 3 (three) times daily as needed for Anxiety., Disp: 30 tablet, Rfl: 0    rchqjbsyu-ekmdhgds-tjpwmaw ala (BIKTARVY) -25 mg (25 kg or greater), Take 1 tablet by mouth once daily., Disp: 90 tablet, Rfl: 1    promethazine (PHENERGAN) 12.5 MG Tab, Take 12.5 mg by mouth., Disp: , Rfl:         Review of patient's allergies indicates:   Allergen Reactions    Sulfa (sulfonamide antibiotics)      Past Medical History:   Diagnosis Date    Diverticulitis 11/2017    HIV (human immunodeficiency virus infection) 2003    no complications, santiago<200 in 2003    Hx of migraines     ? Ocular    Hyperlipidemia     MVP  (mitral valve prolapse) 2010    Negative ETT, nuclear stress 06/2015, echo w/myxomatous MV    Osteomyelitis of thoracic spine 2003    Enterobacter    Osteopenia 2008    Pyelonephritis 12/10/2012     Past Surgical History:   Procedure Laterality Date    BACK SURGERY  2003    Extensive surgery for thoracic vertebral osteomyelitis (enterobacter) with spinal cord compression. (decompressive laminectomy T11 with post instrumentation T7-8 and T12-L1    Breast Augmentation Bilateral 1984    With redo 2008 and 2023    feet  Bilateral     reconstruction     FOOT SURGERY  1978    Maybe 1979     HYSTERECTOMY  1988    And one ovary for endometriosis    Podiactric surgery  09/2016    and 06/2017 to remove 2 screws    RHIZOTOMY      for back pain    Shoulder Injections  2013    For Frozen shoulders     Social History     Socioeconomic History    Marital status:     Number of children: 3   Occupational History    Occupation: Disabled   Tobacco Use    Smoking status: Never    Smokeless tobacco: Never   Substance and Sexual Activity    Alcohol use: Yes     Comment: Rarely     Family History   Problem Relation Age of Onset    Stomach cancer Mother         Metastatic       Travel History: Born in [place], lived in [place], lived in [facility], [place]  Vaccine History: Pneumovax less than 2006, Prevnar 2013, flu vaccine 2012-15, hepatitis A 1, 2 2008, hepatitis B ×3 Menactra 2018, shingrix × 2 2018, (prefers not to have any further vaccines)  Advanced Directive:   Safer Sex:   Gyn:  2023 july  Mammogram: 1/2022, 2/2023  Bone Density: 2011, osteopenia better, 10/2019    Colonoscopy:2008 diverticulosis, 2017 polyps, colonoscopy normal 9/2022    Review of Systems    Constitutional: No fever, chills, sweats, fatigue, weakness, weight loss.     Eyes: No change in vision, loss of vision, diplopia, photophobia. Not UTD eye exam, wearing readers    ENT:    no sore throat, mouth pain, or lesions. UTD dental exam    Cardiovascular: No  chest pain, BOSS, palpitations or pedal edema    Respiratory: No shortness of breath, BOSS, cough, wheeze, sputum,     Gastrointestinal: No abdominal pain, nausea, vomiting, diarrhea, constipation   . Feels bloated after gluten    Genitourinary: No dysuria,   Good liquid intake,    Musculoskeletal: No new pain, joint swelling, or injuries.  No longer requires any pain medications for her back    Integumentary: No new rashes, lesions, or wounds     Neurological: No dizziness, vertigo, unusual headaches, neuropathy, or falls    Psychiatric:     Endocrine: no diabetes,  Hormone levels are optimized now,and thyroid is followed per Dr. Brush.     Lymphatic: No lymphadenopathy, blood loss, anemia, or malignancy    Objective:      Blood pressure 123/80, pulse 78, weight 81.2 kg (179 lb), SpO2 98 %. Body mass index is 28.04 kg/m².  Physical Exam      General: Alert and attentive, cooperative and in no distress, always pleasant and grateful    Eyes: Pupils equal, round, reactive to light, anicteric, EOMI    Neck: Supple, non-tender, no thyromegaly or masses    ENT: EAC patent, TM normal, nares patent, no oral lesions, teeth in good condition, no thrush    Cardiovascular: Regular rate and rhythm, no murmurs, rubs, or gallop    Respiratory: Lungs clear without wheezes, no rales, rub or rhonci    Gastrointestinal: Active bowel sounds, soft, no mass or organomegaly, no tenderness or distention    Genitourinary: No flank tenderness    Vascular: No peripheral edema, phlebitis, pulses normal. Warm and well perfused    Musculoskeletal: Ambulates without difficulty, no acute arthritis, synovitis or myositis. Normal muscle bulk and strength. Still reclines and sits up a little slowly because of her back    Integumentary: Skin without rashes, lesions, or wounds.  Cafe au lait spot on left flank    AnusRectum: per gyn    Neurological: Normal LOC, cranial nerves, speech, reflexes except left AJ, normal gait.      Psychiatric: Normal mood,  speech, demeanor    Lymphatic: No cervical, supraclavicular, axillary, or inguinal lymphadenopathy      Wound:     HIV Table:   HLA-B 5701     TOXOIgG 8/18/2004 negative   RPR 10/2020 non reactive   HEP A IgG     HBsAg  negative   HBsAb 6/16/2017 non reactive p vax   HBcAb  negative   HBeAb     HBeAg     HCVAb 10/2020 non reactive   HBV DNA     HCV RNA 8/18/2004 negative   Vitamin D   normal per Dr. Brush   Chlamydia / GC     TB Gold                      10/29/21               Negative  CD4   10/29/21 683      Recent Diagnostics:        Assessment and Plan:           Human immunodeficiency virus (HIV) disease  -     CBC Auto Differential; Future; Expected date: 11/13/2023  -     Comprehensive Metabolic Panel; Future; Expected date: 11/13/2023  -     Lipid Panel; Future; Expected date: 11/13/2023  -     HIV RNA, Quantitative, PCR; Future; Expected date: 11/13/2023  -     CD4 T-[place] Cells; Future; Expected date: 11/13/2023  -     Quantiferon Gold TB; Future; Expected date: 11/13/2023  -     RPR; Future; Expected date: 11/13/2023  -     Urinalysis; Future; Expected date: 11/13/2023  -     Hepatitis C Antibody; Future; Expected date: 11/13/2023  -     HLA ; Future; Expected date: 11/13/2023    Hyperlipidemia, unspecified hyperlipidemia type  -     Lipid Panel; Future; Expected date: 11/13/2023    HIV positive  -     okevsbkxi-kjueuczz-gotbxiq ala (BIKTARVY) -25 mg (25 kg or greater); Take 1 tablet by mouth once daily.  Dispense: 90 tablet; Refill: 1    Encounter for long-term (current) use of medications    Mixed hyperlipidemia         Go to the lab tomorrow please  Continue biktarvy    Please establish with a primary care doctor prior to January 7    Your next visit will be with Dr. Damari Olivares     does not want any other vaccines      This note was created using Dragon voice recognition software that occasionally misinterpreted phrases or words.

## 2023-12-02 LAB
ALBUMIN SERPL-MCNC: 4.4 G/DL (ref 3.9–4.9)
ALBUMIN/GLOB SERPL: 1.8 {RATIO} (ref 1.2–2.2)
ALP SERPL-CCNC: 97 IU/L (ref 44–121)
ALT SERPL-CCNC: 17 IU/L (ref 0–32)
APPEARANCE UR: ABNORMAL
AST SERPL-CCNC: 23 IU/L (ref 0–40)
BACTERIA #/AREA URNS HPF: ABNORMAL /[HPF]
BASOPHILS # BLD AUTO: 0 X10E3/UL (ref 0–0.2)
BASOPHILS NFR BLD AUTO: 1 %
BILIRUB SERPL-MCNC: 0.6 MG/DL (ref 0–1.2)
BILIRUB UR QL STRIP: NEGATIVE
BUN SERPL-MCNC: 16 MG/DL (ref 8–27)
BUN/CREAT SERPL: 16 (ref 12–28)
CALCIUM SERPL-MCNC: 9.4 MG/DL (ref 8.7–10.3)
CASTS URNS QL MICRO: ABNORMAL /LPF
CD3+CD4+ CELLS # BLD: 1012 /UL (ref 359–1519)
CD3+CD4+ CELLS NFR BLD: 56.2 % (ref 30.8–58.5)
CHLORIDE SERPL-SCNC: 102 MMOL/L (ref 96–106)
CHOLEST SERPL-MCNC: 257 MG/DL (ref 100–199)
CO2 SERPL-SCNC: 24 MMOL/L (ref 20–29)
COLOR UR: ABNORMAL
CREAT SERPL-MCNC: 0.98 MG/DL (ref 0.57–1)
EOSINOPHIL # BLD AUTO: 0.3 X10E3/UL (ref 0–0.4)
EOSINOPHIL NFR BLD AUTO: 3 %
EPI CELLS #/AREA URNS HPF: >10 /HPF (ref 0–10)
ERYTHROCYTE [DISTWIDTH] IN BLOOD BY AUTOMATED COUNT: 12.1 % (ref 11.7–15.4)
EST. GFR  (NO RACE VARIABLE): 65 ML/MIN/1.73
GAMMA INTERFERON BACKGROUND BLD IA-ACNC: 0.03 IU/ML
GLOBULIN SER CALC-MCNC: 2.5 G/DL (ref 1.5–4.5)
GLUCOSE SERPL-MCNC: 105 MG/DL (ref 70–99)
GLUCOSE UR QL STRIP: NEGATIVE
HCT VFR BLD AUTO: 45.3 % (ref 34–46.6)
HCV IGG SERPL QL IA: NON REACTIVE
HDLC SERPL-MCNC: 61 MG/DL
HGB BLD-MCNC: 15.5 G/DL (ref 11.1–15.9)
HGB UR QL STRIP: ABNORMAL
HIV1 RNA # SERPL NAA+PROBE: <20 COPIES/ML
HIV1 RNA SERPL NAA+PROBE-LOG#: NORMAL LOG10COPY/ML
IMM GRANULOCYTES # BLD AUTO: 0 X10E3/UL (ref 0–0.1)
IMM GRANULOCYTES NFR BLD AUTO: 0 %
KETONES UR QL STRIP: NEGATIVE
LDLC SERPL CALC-MCNC: 172 MG/DL (ref 0–99)
LEUKOCYTE ESTERASE UR QL STRIP: ABNORMAL
LYMPHOCYTES # BLD AUTO: 1.8 X10E3/UL (ref 0.7–3.1)
LYMPHOCYTES NFR BLD AUTO: 22 %
M TB IFN-G BLD-IMP: NEGATIVE
M TB IFN-G CD4+ T-CELLS BLD-ACNC: 0.05 IU/ML
M TBIFN-G CD4+ CD8+T-CELLS BLD-ACNC: 0.03 IU/ML
MCH RBC QN AUTO: 33.2 PG (ref 26.6–33)
MCHC RBC AUTO-ENTMCNC: 34.2 G/DL (ref 31.5–35.7)
MCV RBC AUTO: 97 FL (ref 79–97)
MICRO URNS: ABNORMAL
MITOGEN IGNF BLD-ACNC: >10 IU/ML
MONOCYTES # BLD AUTO: 0.6 X10E3/UL (ref 0.1–0.9)
MONOCYTES NFR BLD AUTO: 7 %
NEUTROPHILS # BLD AUTO: 5.6 X10E3/UL (ref 1.4–7)
NEUTROPHILS NFR BLD AUTO: 67 %
NITRITE UR QL STRIP: POSITIVE
PH UR STRIP: 6 [PH] (ref 5–7.5)
PLATELET # BLD AUTO: 243 X10E3/UL (ref 150–450)
POTASSIUM SERPL-SCNC: 4.5 MMOL/L (ref 3.5–5.2)
PROT SERPL-MCNC: 6.9 G/DL (ref 6–8.5)
PROT UR QL STRIP: ABNORMAL
QUANTIFERON TB GOLD (INCUBATED): NORMAL
RBC # BLD AUTO: 4.67 X10E6/UL (ref 3.77–5.28)
RBC #/AREA URNS HPF: >30 /HPF (ref 0–2)
RPR SER QL: NON REACTIVE
SERVICE CMNT-IMP: NORMAL
SODIUM SERPL-SCNC: 139 MMOL/L (ref 134–144)
SP GR UR STRIP: 1.02 (ref 1–1.03)
TRIGL SERPL-MCNC: 133 MG/DL (ref 0–149)
UROBILINOGEN UR STRIP-MCNC: 0.2 MG/DL (ref 0.2–1)
VLDLC SERPL CALC-MCNC: 24 MG/DL (ref 5–40)
WBC # BLD AUTO: 8.4 X10E3/UL (ref 3.4–10.8)
WBC #/AREA URNS HPF: >30 /HPF (ref 0–5)

## 2023-12-06 LAB — HLA-B*57:01 SPEC QL: NEGATIVE

## 2023-12-07 ENCOUNTER — PATIENT MESSAGE (OUTPATIENT)
Dept: INFECTIOUS DISEASES | Facility: CLINIC | Age: 63
End: 2023-12-07

## 2024-04-16 ENCOUNTER — OFFICE VISIT (OUTPATIENT)
Dept: INFECTIOUS DISEASES | Facility: CLINIC | Age: 64
End: 2024-04-16
Payer: COMMERCIAL

## 2024-04-16 VITALS — WEIGHT: 187.19 LBS | BODY MASS INDEX: 29.38 KG/M2 | HEIGHT: 67 IN

## 2024-04-16 DIAGNOSIS — Z21 HIV POSITIVE: ICD-10-CM

## 2024-04-16 DIAGNOSIS — B20 HUMAN IMMUNODEFICIENCY VIRUS (HIV) DISEASE: Primary | ICD-10-CM

## 2024-04-16 PROCEDURE — 99215 OFFICE O/P EST HI 40 MIN: CPT | Mod: S$GLB,,, | Performed by: INTERNAL MEDICINE

## 2024-04-16 RX ORDER — BICTEGRAVIR SODIUM, EMTRICITABINE, AND TENOFOVIR ALAFENAMIDE FUMARATE 50; 200; 25 MG/1; MG/1; MG/1
1 TABLET ORAL DAILY
Qty: 30 TABLET | Refills: 6 | Status: SHIPPED | OUTPATIENT
Start: 2024-04-16 | End: 2024-11-12

## 2024-04-16 NOTE — PROGRESS NOTES
Follow-up  Associated symptoms include arthralgias. Pertinent negatives include no abdominal pain, chest pain, chills, congestion, coughing, diaphoresis, fatigue, fever, headaches, joint swelling, myalgias, nausea, numbness, rash, sore throat, vomiting or weakness.   HIV Positive/AIDS    19: Been on Biktarvy, which she likes and has not taken the atorvastatin yet  No flu vaccine, and refused in  and .  is undergoing chemotherapy  Feels good, has a hormone pellet in her right hip(she is unclear as to what exactly is in it), getting new pellet today   was diagnosed with metastatic colon cancer and is receiving treatment at [facility]    19:  is doing well on chemotherapy for colon cancer. Can't find the lab from December. She is adherent to cholesterol meds.   The hormone pellet is called bioTE(still don't know what it is in it.) and she feels great. The gyn is following her hormone levels and adjusting the dose. Going to the gym and walking.   Gained 7 pounds. Back pain is under control. She takes lots of vitamins    19:  is very ill, down to 102#, has had bowel obstructions from metastatic cancer. She was receiving thyroid hormone from a holistic doctor and referred herself to an endocrinologist. Lots of labs were done which I reviewed. She is now on synthroid and feels better. She has gained weight. She is adherent to meds but about ot run out. She does not want a flu vaccine, fearing it will make her sick and she has to care for her .     20:   in January. She is doing very well considering. She had gyn follow up and estrogen pellets put into buttock. She is eating healthily. Tolerating meds. Had to go back to combivir sustiva because cost of biktarvy was too great. She still has her baseline medicare and COBRA fro 36 months from her 's job. She has not seen endocrine lately because of COVID but will reschedule. Not taking the  lipitor.     12/13/20: her gyn is managing her thyroid meds which she stopped taking months ago. She did not see Dr. Melchor again. Gets estrogen pellets every 15 weeks and she is satisfied with them. She just restarted the atorvastatin last week because she was coming in today. She did not get a flu vaccine and does not want it. She professes that she gets sick for a week after each flu vaccine. 2-3 weeks ago she developed some discomfort in the LLQ and took oregano oil which she perceived made it go away.  Having a mammogram in January. UTD with gyn who is also managing her thyroid meds.     5/19/21: doing great, working part time. Had mammo and was normal and her hormones are at proper levels now. Gyn manages her thyroid and progesterone, and gets estrogen and testosterone pellets injected every 15 weeks. She taking atorvastatin and fish oil. Vitamin D3, K2,zinc,C, drinks mushroom tea daily. 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 She is eating better and works out some. Back has been bothering her more from being on her feet more. Had an illness which is compatible with COVID about 2-3 months ago, and has recovered completely, including sense of smell.     5/16/22: stopped lipitor 2 months ago due to muscle aches. Did not call. She has been on 6 weeks of fish oil 1 bid .she has not been eating healthily however.  She has been on biktarvy for 3 mo.   She is going to be a grandmother, son in [employer] in [place].     11/14/22: since last visit, her back was out on a few occasions. Thinks she had the flu, but did not call, 2-3 weeks ago. No fever, bad headache, coughing, with sputum and rhinorrhea. Did not go for lab. Taking DIM supplement, Biote' (estrogen/testosterone) pellets, thyroid po. Declines flu vaccine. Had normal colonoscopy. Mammogram due in January.    5/15/23: going to have re-do breast augmentation this week, per Dr. Somers, at his surgical suite. She is up  "to date on her mammogram 2/2023. She is doing well in general. She lost her 14 year old dog to dementia/illness in the last few days.     11/13/23:last visit after over 15 years of care(records in [facility] and paper chart).    doing well. On biktarvy since 2018. Now getting meds from [facility]. Dr. Brush is managing her thyroid. Had breast augmentation to reduce volume and not pleased with them. Silicone was replaced by saline. She feels better however. Did not have labs since last November.   She was in the [employer] and her son is a chief escalera officer now.   =================  [place]   [place]  ============    04/16/2024   Pleasant, soft spoken, compliant with meds; Health conscious; takes multiple vitamins d3/K2. Mag., Se, fish oil qid, zinc, iodine,  hormone supplement pellets lets q 15 weeks progastrin/testosterone; NP manages thyroid,   We reviewed HIV hx. She received frequent blood transfusions in 80s ->Younguest son in was born in 1987   She had Back sx 2003, "cage in the back:? Chronic Back pains, " no idea when will it hit" she weaned self from opiates, and is not following with pain management MD any more. + second hand smoking (Parents smoked when she was a child)  Retired [employer]  BP ok     "I do not do vaccines"  Mamogram/Bone density cherry - follows with obgyn          Current Outpatient Medications:     co-enzyme Q-10 30 mg capsule, Take 30 mg by mouth 2 (two) times daily., Disp: , Rfl:     lidocaine (LIDODERM) 5 %, Place 1 patch onto the skin every 24 hours. Remove & Discard patch within 12 hours or as directed by MD, Disp: , Rfl:     NP THYROID 30 mg Tab, Take 30 mg by mouth once daily., Disp: , Rfl:     NP THYROID 60 mg Tab, Take 60 mg by mouth once daily., Disp: , Rfl:     omega-3 fatty acids/fish oil (FISH OIL-OMEGA-3 FATTY ACIDS) 300-1,000 mg capsule, Take 4 capsules by mouth once daily., Disp: , Rfl:     progesterone (PROMETRIUM) 100 MG capsule, Take 100 mg by mouth every evening., " Disp: , Rfl: 11    promethazine (PHENERGAN) 12.5 MG Tab, Take 12.5 mg by mouth., Disp: , Rfl:     selenium 50 mcg Tab, Take 200 mcg by mouth once daily., Disp: , Rfl:     zinc sulfate (ZINCATE) 50 mg zinc (220 mg) capsule, Take 220 mg by mouth 3 (three) times daily. Dosage unconfirmed, Disp: , Rfl:     ALPRAZolam (XANAX) 0.25 MG tablet, Take 1 tablet (0.25 mg total) by mouth 3 (three) times daily as needed for Anxiety., Disp: 30 tablet, Rfl: 0    cfwdszoyk-vrqlrfgr-luoeknw ala (BIKTARVY) -25 mg (25 kg or greater), Take 1 tablet by mouth once daily., Disp: 30 tablet, Rfl: 6    vitamin D (VITAMIN D3) 1000 units Tab, Take 5,000 Units by mouth once daily. drops, Disp: , Rfl:       Review of patient's allergies indicates:   Allergen Reactions    Sulfa (sulfonamide antibiotics)      BActrim -- non stop  vomiting       Past Medical History:   Diagnosis Date    Diverticulitis 11/2017    HIV (human immunodeficiency virus infection) 2003    no complications, santiago<200 in 2003    Hx of migraines     ? Ocular    Hyperlipidemia     MVP (mitral valve prolapse) 2010    Negative ETT, nuclear stress 06/2015, echo w/myxomatous MV    Osteomyelitis of thoracic spine 2003    Enterobacter    Osteopenia 2008    Pyelonephritis 12/10/2012     Past Surgical History:   Procedure Laterality Date    BACK SURGERY  2003    Extensive surgery for thoracic vertebral osteomyelitis (enterobacter) with spinal cord compression. (decompressive laminectomy T11 with post instrumentation T7-8 and T12-L1    Breast Augmentation Bilateral 1984    With redo 2008 and 2023    feet  Bilateral     reconstruction     FOOT SURGERY  1978    Maybe 1979     HYSTERECTOMY  1988    And one ovary for endometriosis    Podiactric surgery  09/2016    and 06/2017 to remove 2 screws    RHIZOTOMY      for back pain    Shoulder Injections  2013    For Frozen shoulders     Social History     Socioeconomic History    Marital status:     Number of children: 3    Occupational History    Occupation: Disabled   Tobacco Use    Smoking status: Never    Smokeless tobacco: Never   Substance and Sexual Activity    Alcohol use: Yes     Comment: Rarely     Family History   Problem Relation Name Age of Onset    Stomach cancer Mother          Metastatic     Review of Systems   Constitutional:  Negative for appetite change, chills, diaphoresis, fatigue, fever and unexpected weight change.   HENT:  Negative for congestion, dental problem, ear pain, hearing loss, mouth sores, nosebleeds, postnasal drip, rhinorrhea, sinus pain, sore throat and trouble swallowing.         UTD dental exam   Eyes:  Negative for photophobia, pain and visual disturbance.        Not UTD eye exam,  wearing readers       Respiratory:  Negative for cough, choking, chest tightness and shortness of breath.    Cardiovascular:  Negative for chest pain, palpitations and leg swelling.   Gastrointestinal:  Negative for abdominal pain, anal bleeding, blood in stool, constipation, diarrhea, nausea, rectal pain and vomiting.        Feels bloated after gluten   Endocrine: Negative for polydipsia and polyuria.        Progesterone testosterone pellets  thyroid is followed per Dr. Brush.    Genitourinary:  Negative for difficulty urinating, dysuria, flank pain, frequency, genital sores, hematuria, urgency and vaginal discharge.   Musculoskeletal:  Positive for arthralgias and back pain. Negative for joint swelling and myalgias.        No longer requires any pain medications for her back     Skin:  Negative for rash.   Allergic/Immunologic: Negative for environmental allergies, food allergies and immunocompromised state.   Neurological:  Negative for dizziness, syncope, speech difficulty, weakness, numbness and headaches.   Hematological:  Negative for adenopathy. Does not bruise/bleed easily.   Psychiatric/Behavioral:  Negative for dysphoric mood and sleep disturbance. The patient is not nervous/anxious.      Objective:     "  Blood pressure (P) 126/74, pulse (!) (P) 59, temperature (P) 97.7 °F (36.5 °C), height 5' 7" (1.702 m), weight 84.9 kg (187 lb 3.2 oz). Body mass index is 29.32 kg/m².  Vitals:    04/16/24 0929   BP: (P) 126/74   Pulse: (!) (P) 59   Temp: (P) 97.7 °F (36.5 °C)     Physical Exam  Constitutional:       General: She is not in acute distress.     Appearance: She is not diaphoretic.   HENT:      Head: Atraumatic.      Comments: teeth in good condition,      Right Ear: External ear normal.      Left Ear: External ear normal.      Nose: Nose normal.   Eyes:      General: No scleral icterus.     Conjunctiva/sclera: Conjunctivae normal.      Pupils: Pupils are equal, round, and reactive to light.   Neck:      Vascular: No JVD.   Cardiovascular:      Rate and Rhythm: Normal rate and regular rhythm.      Heart sounds: Normal heart sounds.   Pulmonary:      Effort: Pulmonary effort is normal.      Breath sounds: Normal breath sounds. No wheezing or rales.   Chest:      Chest wall: No tenderness.   Abdominal:      General: Bowel sounds are normal. There is no distension.      Palpations: Abdomen is soft. There is no mass.      Tenderness: There is no abdominal tenderness. There is no guarding or rebound.   Genitourinary:     Comments:  per gyn  Musculoskeletal:         General: Normal range of motion.      Cervical back: Normal range of motion and neck supple.      Comments: Still reclines and sits up a little slowly because of her back     Lymphadenopathy:      Cervical: No cervical adenopathy.   Skin:     General: Skin is warm and dry.      Findings: No erythema or rash.      Comments:   Cafe au lait spot on left flank   Neurological:      Mental Status: She is alert and oriented to person, place, and time.      Cranial Nerves: No cranial nerve deficit.      Sensory: No sensory deficit.      Coordination: Coordination normal.      Gait: Gait normal.      Deep Tendon Reflexes: Reflexes normal (normal reflexes except left " AJ,).   Psychiatric:         Behavior: Behavior normal.         Thought Content: Thought content normal.      Comments: always pleasant and grateful     Wound:   Travel History: Born in [place], lived in [place], lived in [facility], [place]  Vaccine History: Pneumovax less than 2006, Prevnar 2013, flu vaccine 2012-15, hepatitis A 1, 2 2008, hepatitis B ×3 Menactra 2018, shingrix × 2 2018, (prefers not to have any further vaccines)  Advanced Directive:   Safer Sex:   Gyn:  2023 july  Mammogram: 1/2022, 2/2023  Bone Density: 2011, osteopenia better, 10/2019    Colonoscopy:2008 diverticulosis, 2017 polyps, colonoscopy normal 9/2022  PCP:     HIV Table:   HLA-B 5701     TOXOIgG 8/18/2004 negative   RPR 10/2020 non reactive   HEP A IgG     HBsAg  negative   HBsAb 6/16/2017 non reactive p vax   HBcAb  negative   HBeAb     HBeAg     HCVAb 10/2020 non reactive   HBV DNA     HCV RNA 8/18/2004 negative   Vitamin D   normal per Dr. Brush   Chlamydia / GC     TB Gold                      10/29/21               Negative  CD4   10/29/21 683      Recent Diagnostics:        Assessment and Plan:           Human immunodeficiency virus (HIV) disease  -     HIV RNA, Quantitative, PCR; Future; Expected date: 04/17/2024  -     CD4 T-Culver Cells; Future; Expected date: 04/17/2024  -     C-Reactive Protein; Future; Expected date: 04/17/2024  -     CBC Auto Differential; Future; Expected date: 04/17/2024  -     Comprehensive Metabolic Panel; Future; Expected date: 04/17/2024  -     Lipid Panel; Future; Expected date: 04/17/2024  -     TSH; Future; Expected date: 04/17/2024  -     Vitamin D; Future; Expected date: 04/17/2024  -     Toxoplasma Gondii IgG; Future; Expected date: 04/17/2024  -     Cytomegalovirus Antibody, IgG; Future; Expected date: 04/17/2024  -     Quantiferon Gold TB; Future; Expected date: 04/17/2024  -     Treponema Pallidum (Syphillis) Antibody; Future; Expected date: 04/17/2024  -     FTA antibodies, IgG and IgM;  Future; Expected date: 04/16/2024  -  Glucose-6-Phosphate Dehydrogenase; Future; Expected date: 04/16/2024  -     Hepatitis B Core Antibody, Total; Future; Expected date: 04/17/2024  -     Hepatitis B Surface Ab, Qualitative; Future; Expected date: 04/17/2024  -     Hepatitis C Antibody; Future; Expected date: 04/17/2024  -     Hepatitis B Surface Antigen; Future; Expected date: 04/17/2024      -     mraeexfsg-xdydganr-nbsfwtv ala (BIKTARVY) -25 mg (25 kg or greater); Take 1 tablet by mouth once daily.  Dispense: 30 tablet; Refill: 6          Continue biktarvy  Does not want any other vaccines      This note was created using voice recognition software that occasionally misinterpreted phrases or words.

## 2024-04-25 LAB
25(OH)D3+25(OH)D2 SERPL-MCNC: 58.9 NG/ML (ref 30–100)
ALBUMIN SERPL-MCNC: 4.4 G/DL (ref 3.9–4.9)
ALBUMIN/GLOB SERPL: 2 {RATIO} (ref 1.2–2.2)
ALP SERPL-CCNC: 98 IU/L (ref 44–121)
ALT SERPL-CCNC: 16 IU/L (ref 0–32)
AST SERPL-CCNC: 20 IU/L (ref 0–40)
BASOPHILS # BLD AUTO: 0.1 X10E3/UL (ref 0–0.2)
BASOPHILS NFR BLD AUTO: 1 %
BILIRUB SERPL-MCNC: 0.3 MG/DL (ref 0–1.2)
BUN SERPL-MCNC: 18 MG/DL (ref 8–27)
BUN/CREAT SERPL: 19 (ref 12–28)
CALCIUM SERPL-MCNC: 9.4 MG/DL (ref 8.7–10.3)
CD3+CD4+ CELLS # BLD: 889 /UL (ref 359–1519)
CD3+CD4+ CELLS NFR BLD: 49.4 % (ref 30.8–58.5)
CHLORIDE SERPL-SCNC: 102 MMOL/L (ref 96–106)
CHOLEST SERPL-MCNC: 239 MG/DL (ref 100–199)
CMV IGG SERPL IA-ACNC: 9 U/ML (ref 0–0.59)
CO2 SERPL-SCNC: 27 MMOL/L (ref 20–29)
CREAT SERPL-MCNC: 0.94 MG/DL (ref 0.57–1)
CRP SERPL-MCNC: <1 MG/L (ref 0–10)
EOSINOPHIL # BLD AUTO: 0.3 X10E3/UL (ref 0–0.4)
EOSINOPHIL NFR BLD AUTO: 5 %
ERYTHROCYTE [DISTWIDTH] IN BLOOD BY AUTOMATED COUNT: 12.4 % (ref 11.7–15.4)
EST. GFR  (NO RACE VARIABLE): 68 ML/MIN/1.73
GAMMA INTERFERON BACKGROUND BLD IA-ACNC: 0.07 IU/ML
GLOBULIN SER CALC-MCNC: 2.2 G/DL (ref 1.5–4.5)
GLUCOSE SERPL-MCNC: 101 MG/DL (ref 70–99)
HBV CORE AB SERPL QL IA: NEGATIVE
HBV SURFACE AB SER QL: NON REACTIVE
HBV SURFACE AG SERPL QL IA: NEGATIVE
HCT VFR BLD AUTO: 46.3 % (ref 34–46.6)
HCV IGG SERPL QL IA: NON REACTIVE
HDLC SERPL-MCNC: 50 MG/DL
HGB BLD-MCNC: 15.6 G/DL (ref 11.1–15.9)
HIV1 RNA # SERPL NAA+PROBE: <20 COPIES/ML
HIV1 RNA SERPL NAA+PROBE-LOG#: NORMAL LOG10COPY/ML
IMM GRANULOCYTES # BLD AUTO: 0 X10E3/UL (ref 0–0.1)
IMM GRANULOCYTES NFR BLD AUTO: 0 %
LDLC SERPL CALC-MCNC: 167 MG/DL (ref 0–99)
LYMPHOCYTES # BLD AUTO: 1.8 X10E3/UL (ref 0.7–3.1)
LYMPHOCYTES NFR BLD AUTO: 33 %
M TB IFN-G BLD-IMP: NEGATIVE
M TB IFN-G CD4+ BCKGRND COR BLD-ACNC: 0.14 IU/ML
M TB IFN-G CD4+CD8+ BCKGRND COR BLD-ACNC: 0.08 IU/ML
MCH RBC QN AUTO: 33.5 PG (ref 26.6–33)
MCHC RBC AUTO-ENTMCNC: 33.7 G/DL (ref 31.5–35.7)
MCV RBC AUTO: 99 FL (ref 79–97)
MITOGEN IGNF BCKGRD COR BLD-ACNC: >10 IU/ML
MONOCYTES # BLD AUTO: 0.5 X10E3/UL (ref 0.1–0.9)
MONOCYTES NFR BLD AUTO: 8 %
NEUTROPHILS # BLD AUTO: 2.8 X10E3/UL (ref 1.4–7)
NEUTROPHILS NFR BLD AUTO: 53 %
PLATELET # BLD AUTO: 230 X10E3/UL (ref 150–450)
POTASSIUM SERPL-SCNC: 4.7 MMOL/L (ref 3.5–5.2)
PROT SERPL-MCNC: 6.6 G/DL (ref 6–8.5)
QUANTIFERON TB GOLD (INCUBATED): NORMAL
RBC # BLD AUTO: 4.66 X10E6/UL (ref 3.77–5.28)
SERVICE CMNT-IMP: NORMAL
SODIUM SERPL-SCNC: 139 MMOL/L (ref 134–144)
T GONDII IGG SERPL IA-ACNC: <3 IU/ML (ref 0–7.1)
T PALLIDUM AB SER QL IF: NON REACTIVE
TRIGL SERPL-MCNC: 120 MG/DL (ref 0–149)
TSH SERPL DL<=0.005 MIU/L-ACNC: 2.41 UIU/ML (ref 0.45–4.5)
VLDLC SERPL CALC-MCNC: 22 MG/DL (ref 5–40)
WBC # BLD AUTO: 5.3 X10E3/UL (ref 3.4–10.8)

## 2024-10-16 ENCOUNTER — OFFICE VISIT (OUTPATIENT)
Dept: INFECTIOUS DISEASES | Facility: CLINIC | Age: 64
End: 2024-10-16
Payer: MEDICARE

## 2024-10-16 VITALS
BODY MASS INDEX: 27.21 KG/M2 | DIASTOLIC BLOOD PRESSURE: 68 MMHG | TEMPERATURE: 98 F | OXYGEN SATURATION: 97 % | HEIGHT: 67 IN | WEIGHT: 173.38 LBS | HEART RATE: 68 BPM | SYSTOLIC BLOOD PRESSURE: 106 MMHG

## 2024-10-16 DIAGNOSIS — B20 HUMAN IMMUNODEFICIENCY VIRUS (HIV) DISEASE: Primary | ICD-10-CM

## 2024-10-16 DIAGNOSIS — H53.8 BLURRY VISION: ICD-10-CM

## 2024-10-16 PROCEDURE — 99999 PR PBB SHADOW E&M-EST. PATIENT-LVL V: CPT | Mod: PBBFAC,,, | Performed by: INTERNAL MEDICINE

## 2024-10-16 NOTE — PROGRESS NOTES
Follow-up  Associated symptoms include arthralgias. Pertinent negatives include no abdominal pain, chest pain, chills, congestion, coughing, diaphoresis, fatigue, fever, headaches, joint swelling, myalgias, nausea, numbness, rash, sore throat, vomiting or weakness.   HIV Positive/AIDS    19: Been on Biktarvy, which she likes and has not taken the atorvastatin yet  No flu vaccine, and refused in  and .  is undergoing chemotherapy  Feels good, has a hormone pellet in her right hip(she is unclear as to what exactly is in it), getting new pellet today   was diagnosed with metastatic colon cancer and is receiving treatment at [facility]    19:  is doing well on chemotherapy for colon cancer. Can't find the lab from December. She is adherent to cholesterol meds.   The hormone pellet is called bioTE(still don't know what it is in it.) and she feels great. The gyn is following her hormone levels and adjusting the dose. Going to the gym and walking.   Gained 7 pounds. Back pain is under control. She takes lots of vitamins    19:  is very ill, down to 102#, has had bowel obstructions from metastatic cancer. She was receiving thyroid hormone from a holistic doctor and referred herself to an endocrinologist. Lots of labs were done which I reviewed. She is now on synthroid and feels better. She has gained weight. She is adherent to meds but about ot run out. She does not want a flu vaccine, fearing it will make her sick and she has to care for her .     20:   in January. She is doing very well considering. She had gyn follow up and estrogen pellets put into buttock. She is eating healthily. Tolerating meds. Had to go back to combivir sustiva because cost of biktarvy was too great. She still has her baseline medicare and COBRA fro 36 months from her 's job. She has not seen endocrine lately because of COVID but will reschedule. Not taking the  lipitor.     12/13/20: her gyn is managing her thyroid meds which she stopped taking months ago. She did not see Dr. Melchor again. Gets estrogen pellets every 15 weeks and she is satisfied with them. She just restarted the atorvastatin last week because she was coming in today. She did not get a flu vaccine and does not want it. She professes that she gets sick for a week after each flu vaccine. 2-3 weeks ago she developed some discomfort in the LLQ and took oregano oil which she perceived made it go away.  Having a mammogram in January. UTD with gyn who is also managing her thyroid meds.     5/19/21: doing great, working part time. Had mammo and was normal and her hormones are at proper levels now. Gyn manages her thyroid and progesterone, and gets estrogen and testosterone pellets injected every 15 weeks. She taking atorvastatin and fish oil. Vitamin D3, K2,zinc,C, drinks mushroom tea daily. 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 She is eating better and works out some. Back has been bothering her more from being on her feet more. Had an illness which is compatible with COVID about 2-3 months ago, and has recovered completely, including sense of smell.     5/16/22: stopped lipitor 2 months ago due to muscle aches. Did not call. She has been on 6 weeks of fish oil 1 bid .she has not been eating healthily however.  She has been on biktarvy for 3 mo.   She is going to be a grandmother, son in [place] in [place].     11/14/22: since last visit, her back was out on a few occasions. Thinks she had the flu, but did not call, 2-3 weeks ago. No fever, bad headache, coughing, with sputum and rhinorrhea. Did not go for lab. Taking DIM supplement, Biote' (estrogen/testosterone) pellets, thyroid po. Declines flu vaccine. Had normal colonoscopy. Mammogram due in January.    5/15/23: going to have re-do breast augmentation this week, per Dr. Somers, at his surgical suite. She is up  "to date on her mammogram 2/2023. She is doing well in general. She lost her 14 year old dog to dementia/illness in the last few days.     11/13/23:last visit after over 15 years of care(records in [facility] and paper chart).    doing well. On biktarvy since 2018. Now getting meds from [facility]. Dr. Brush is managing her thyroid. Had breast augmentation to reduce volume and not pleased with them. Silicone was replaced by saline. She feels better however. Did not have labs since last November.   She was in the [employer] and her son is a chief escalera officer now.   =================  [place]   [place]  ============    04/16/2024   Pleasant, soft spoken, compliant with meds; Health conscious; takes multiple vitamins d3/K2. Mag., Se, fish oil qid, zinc, iodine,  hormone supplement pellets lets q 15 weeks progastrin/testosterone; NP manages thyroid,   We reviewed HIV hx. She received frequent blood transfusions in 80s ->Younguest son in was born in 1987   She had Back sx 2003, "cage in the back:? Chronic Back pains, " no idea when will it hit" she weaned self from opiates, and is not following with pain management MD any more. + second hand smoking (Parents smoked when she was a child)  Retired [employer]  BP ok     "I do not do vaccines"  Mamogram/Bone density cherry - follows with obgyn    10/19/2024  No new complaints.  Compliant with meds  She had bl upper Blepharoplasty, which improved visual field  Mammogram will be due in Feb  She just had a gyn wxam and all was fine all fine  Looking for a new PCP  She will follow with ophthalmologist  Dentist follow up in in Jan          Current Outpatient Medications:     hzdahowto-jseihcss-mypzlto ala (BIKTARVY) -25 mg (25 kg or greater), Take 1 tablet by mouth once daily., Disp: 30 tablet, Rfl: 6    co-enzyme Q-10 30 mg capsule, Take 30 mg by mouth 2 (two) times daily., Disp: , Rfl:     NP THYROID 30 mg Tab, Take 30 mg by mouth once daily., Disp: , Rfl:     NP " THYROID 60 mg Tab, Take 60 mg by mouth once daily., Disp: , Rfl:     omega-3 fatty acids/fish oil (FISH OIL-OMEGA-3 FATTY ACIDS) 300-1,000 mg capsule, Take 4 capsules by mouth once daily., Disp: , Rfl:     progesterone (PROMETRIUM) 100 MG capsule, Take 200 mg by mouth every evening., Disp: , Rfl: 11    selenium 50 mcg Tab, Take 200 mcg by mouth once daily., Disp: , Rfl:     vitamin D (VITAMIN D3) 1000 units Tab, Take 5,000 Units by mouth once daily. drops, Disp: , Rfl:     zinc sulfate (ZINCATE) 50 mg zinc (220 mg) capsule, Take 220 mg by mouth 3 (three) times daily. Dosage unconfirmed, Disp: , Rfl:     ALPRAZolam (XANAX) 0.25 MG tablet, Take 1 tablet (0.25 mg total) by mouth 3 (three) times daily as needed for Anxiety., Disp: 30 tablet, Rfl: 0    lidocaine (LIDODERM) 5 %, Place 1 patch onto the skin every 24 hours. Remove & Discard patch within 12 hours or as directed by MD (Patient not taking: Reported on 10/16/2024), Disp: , Rfl:     promethazine (PHENERGAN) 12.5 MG Tab, Take 12.5 mg by mouth. (Patient not taking: Reported on 10/16/2024), Disp: , Rfl:       Review of patient's allergies indicates:   Allergen Reactions    Sulfa (sulfonamide antibiotics)      BActrim -- non stop  vomiting       Past Medical History:   Diagnosis Date    Diverticulitis 11/2017    HIV (human immunodeficiency virus infection) 2003    no complications, santiago<200 in 2003    Hx of migraines     ? Ocular    Hyperlipidemia     MVP (mitral valve prolapse) 2010    Negative ETT, nuclear stress 06/2015, echo w/myxomatous MV    Osteomyelitis of thoracic spine 2003    Enterobacter    Osteopenia 2008    Pyelonephritis 12/10/2012     Past Surgical History:   Procedure Laterality Date    BACK SURGERY  2003    Extensive surgery for thoracic vertebral osteomyelitis (enterobacter) with spinal cord compression. (decompressive laminectomy T11 with post instrumentation T7-8 and T12-L1    Breast Augmentation Bilateral 1984    With redo 2008 and 2023     feet  Bilateral     reconstruction     FOOT SURGERY  1978    Maybe 1979     HYSTERECTOMY  1988    And one ovary for endometriosis    Podiactric surgery  09/2016    and 06/2017 to remove 2 screws    RHIZOTOMY      for back pain    Shoulder Injections  2013    For Frozen shoulders     Social History     Socioeconomic History    Marital status:     Number of children: 3   Occupational History    Occupation: Disabled   Tobacco Use    Smoking status: Never    Smokeless tobacco: Never   Substance and Sexual Activity    Alcohol use: Yes     Comment: Rarely     Family History   Problem Relation Name Age of Onset    Stomach cancer Mother          Metastatic     Review of Systems   Constitutional:  Negative for appetite change, chills, diaphoresis, fatigue, fever and unexpected weight change.   HENT:  Negative for congestion, dental problem, ear pain, hearing loss, mouth sores, nosebleeds, postnasal drip, rhinorrhea, sinus pain, sore throat and trouble swallowing.         UTD dental exam   Eyes:  Negative for photophobia, pain and visual disturbance.        Not UTD eye exam, plans to follow ;  wearing readers       Respiratory:  Negative for cough, choking, chest tightness and shortness of breath.    Cardiovascular:  Negative for chest pain, palpitations and leg swelling.   Gastrointestinal:  Negative for abdominal pain, anal bleeding, blood in stool, constipation, diarrhea, nausea, rectal pain and vomiting.        Feels bloated after gluten   Endocrine: Negative for polydipsia and polyuria.        Progesterone testosterone pellets  thyroid is followed per Dr. Brush.    Genitourinary:  Negative for difficulty urinating, dysuria, flank pain, frequency, genital sores, hematuria, urgency and vaginal discharge.   Musculoskeletal:  Positive for arthralgias and back pain. Negative for joint swelling and myalgias.        No longer requires any pain medications for her back     Skin:  Negative for rash.  "  Allergic/Immunologic: Negative for environmental allergies, food allergies and immunocompromised state.   Neurological:  Negative for dizziness, syncope, speech difficulty, weakness, numbness and headaches.   Hematological:  Negative for adenopathy. Does not bruise/bleed easily.   Psychiatric/Behavioral:  Negative for dysphoric mood and sleep disturbance. The patient is not nervous/anxious.      Objective:      Blood pressure 106/68, pulse 68, temperature 98.1 °F (36.7 °C), temperature source Temporal, height 5' 7" (1.702 m), weight 78.7 kg (173 lb 6.4 oz), SpO2 97%. Body mass index is 27.16 kg/m².  Vitals:    10/16/24 0902   BP: 106/68   Pulse: 68   Temp: 98.1 °F (36.7 °C)     Physical Exam  Constitutional:       General: She is not in acute distress.     Appearance: She is not diaphoretic.   HENT:      Head: Atraumatic.      Comments: teeth in good condition,      Right Ear: External ear normal.      Left Ear: External ear normal.      Nose: Nose normal.   Eyes:      General: No scleral icterus.     Conjunctiva/sclera: Conjunctivae normal.      Pupils: Pupils are equal, round, and reactive to light.   Neck:      Vascular: No JVD.   Cardiovascular:      Rate and Rhythm: Normal rate and regular rhythm.      Heart sounds: Normal heart sounds.   Pulmonary:      Effort: Pulmonary effort is normal.      Breath sounds: Normal breath sounds. No wheezing or rales.   Chest:      Chest wall: No tenderness.   Abdominal:      General: Bowel sounds are normal. There is no distension.      Palpations: Abdomen is soft. There is no mass.      Tenderness: There is no abdominal tenderness. There is no guarding or rebound.   Genitourinary:     Comments:  per gyn  Musculoskeletal:         General: Normal range of motion.      Cervical back: Normal range of motion and neck supple.      Comments: Still reclines and sits up a little slowly because of her back     Lymphadenopathy:      Cervical: No cervical adenopathy.   Skin:     " General: Skin is warm and dry.      Findings: No erythema or rash.      Comments:   Cafe au lait spot on left flank   Neurological:      Mental Status: She is alert and oriented to person, place, and time.      Cranial Nerves: No cranial nerve deficit.      Sensory: No sensory deficit.      Coordination: Coordination normal.      Gait: Gait normal.      Deep Tendon Reflexes: Reflexes normal (normal reflexes except left AJ,).   Psychiatric:         Behavior: Behavior normal.         Thought Content: Thought content normal.      Comments: always pleasant and grateful     Wound:   Travel History: Born in [place], lived in [place], lived in [facility], [place]  Vaccine History:   Pneumovax less than 2006, Prevnar 2013,   flu vaccine 2012-15,   hepatitis A 1, 2 2008,   hepatitis B ×3   Menactra 2018,   shingrix × 2 2018, (prefers not to have any further vaccines)  Advanced Directive:   Safer Sex:   Gyn:  2023 july  Mammogram: 1/2022, 2/2023  Bone Density: 2011, osteopenia better, 10/2019       Colonoscopy: 2008 diverticulosis, 2017 polyps, colonoscopy normal 9/2022  PCP:     HIV Table:   HLA-B 5701     TOXOIgG 8/18/2004 negative   RPR 10/2020 non reactive   HEP A IgG     HBsAg  negative   HBsAb 6/16/2017 non reactive p vax   HBcAb  negative   HBeAb     HBeAg     HCVAb 10/2020 non reactive   HBV DNA     HCV RNA 8/18/2004 negative   Vitamin D   normal per Dr. Brush   Chlamydia / GC     TB Gold                      10/29/21               Negative  CD4   10/29/21 683      Recent Diagnostics:        Assessment and Plan:   Human immunodeficiency virus (HIV) disease  -     CBC Auto Differential; Standing  -     Comprehensive Metabolic Panel; Standing  -     CD4 T-[place] Cells; Standing; Expected date: 10/16/2024  -     HIV RNA, Quantitative, PCR; Standing; Expected date: 10/16/2024    Blurry vision  -     Ambulatory referral/consult to Optometry; Future; Expected date: 10/23/2024           Continue biktarvy  Does not want  any other vaccines  Need eye exam  Has a plan for dental and mammogram  Will consider bone density next time       This note was created using voice recognition software that occasionally misinterpreted phrases or words.

## 2024-12-31 DIAGNOSIS — Z21 HIV POSITIVE: ICD-10-CM

## 2024-12-31 RX ORDER — BICTEGRAVIR SODIUM, EMTRICITABINE, AND TENOFOVIR ALAFENAMIDE FUMARATE 50; 200; 25 MG/1; MG/1; MG/1
1 TABLET ORAL
Qty: 30 TABLET | Refills: 6 | Status: SHIPPED | OUTPATIENT
Start: 2024-12-31

## 2025-03-05 ENCOUNTER — TELEPHONE (OUTPATIENT)
Dept: OPHTHALMOLOGY | Facility: CLINIC | Age: 65
End: 2025-03-05
Payer: MEDICARE

## 2025-04-23 ENCOUNTER — OFFICE VISIT (OUTPATIENT)
Dept: INFECTIOUS DISEASES | Facility: CLINIC | Age: 65
End: 2025-04-23
Payer: MEDICARE

## 2025-04-23 VITALS
OXYGEN SATURATION: 97 % | WEIGHT: 184.38 LBS | BODY MASS INDEX: 28.94 KG/M2 | DIASTOLIC BLOOD PRESSURE: 72 MMHG | TEMPERATURE: 97 F | HEIGHT: 67 IN | SYSTOLIC BLOOD PRESSURE: 116 MMHG

## 2025-04-23 DIAGNOSIS — Z21 HIV POSITIVE: ICD-10-CM

## 2025-04-23 DIAGNOSIS — M19.90 OSTEOARTHRITIS, UNSPECIFIED OSTEOARTHRITIS TYPE, UNSPECIFIED SITE: Primary | ICD-10-CM

## 2025-04-23 PROCEDURE — 1159F MED LIST DOCD IN RCRD: CPT | Mod: CPTII,S$GLB,, | Performed by: INTERNAL MEDICINE

## 2025-04-23 PROCEDURE — 3074F SYST BP LT 130 MM HG: CPT | Mod: CPTII,S$GLB,, | Performed by: INTERNAL MEDICINE

## 2025-04-23 PROCEDURE — 3008F BODY MASS INDEX DOCD: CPT | Mod: CPTII,S$GLB,, | Performed by: INTERNAL MEDICINE

## 2025-04-23 PROCEDURE — 99215 OFFICE O/P EST HI 40 MIN: CPT | Mod: S$GLB,,, | Performed by: INTERNAL MEDICINE

## 2025-04-23 PROCEDURE — 99999 PR PBB SHADOW E&M-EST. PATIENT-LVL III: CPT | Mod: PBBFAC,,, | Performed by: INTERNAL MEDICINE

## 2025-04-23 PROCEDURE — 3078F DIAST BP <80 MM HG: CPT | Mod: CPTII,S$GLB,, | Performed by: INTERNAL MEDICINE

## 2025-04-23 RX ORDER — BICTEGRAVIR SODIUM, EMTRICITABINE, AND TENOFOVIR ALAFENAMIDE FUMARATE 50; 200; 25 MG/1; MG/1; MG/1
1 TABLET ORAL DAILY
Qty: 30 TABLET | Refills: 11 | Status: SHIPPED | OUTPATIENT
Start: 2025-04-23 | End: 2026-04-23

## 2025-04-23 RX ORDER — LIDOCAINE 50 MG/G
1 PATCH TOPICAL DAILY PRN
Qty: 60 PATCH | Refills: 2 | Status: SHIPPED | OUTPATIENT
Start: 2025-04-23

## 2025-04-23 NOTE — PROGRESS NOTES
Follow-up  Associated symptoms include arthralgias. Pertinent negatives include no abdominal pain, chest pain, chills, congestion, coughing, diaphoresis, fatigue, fever, headaches, joint swelling, myalgias, nausea, numbness, rash, sore throat, vomiting or weakness.   HIV Positive/AIDS    19: Been on Biktarvy, which she likes and has not taken the atorvastatin yet  No flu vaccine, and refused in  and .  is undergoing chemotherapy  Feels good, has a hormone pellet in her right hip(she is unclear as to what exactly is in it), getting new pellet today   was diagnosed with metastatic colon cancer and is receiving treatment at [facility]    19:  is doing well on chemotherapy for colon cancer. Can't find the lab from December. She is adherent to cholesterol meds.   The hormone pellet is called bioTE(still don't know what it is in it.) and she feels great. The gyn is following her hormone levels and adjusting the dose. Going to the gym and walking.   Gained 7 pounds. Back pain is under control. She takes lots of vitamins    19:  is very ill, down to 102#, has had bowel obstructions from metastatic cancer. She was receiving thyroid hormone from a holistic doctor and referred herself to an endocrinologist. Lots of labs were done which I reviewed. She is now on synthroid and feels better. She has gained weight. She is adherent to meds but about ot run out. She does not want a flu vaccine, fearing it will make her sick and she has to care for her .     20:   in January. She is doing very well considering. She had gyn follow up and estrogen pellets put into buttock. She is eating healthily. Tolerating meds. Had to go back to combivir sustiva because cost of biktarvy was too great. She still has her baseline medicare and COBRA fro 36 months from her 's job. She has not seen endocrine lately because of COVID but will reschedule. Not taking the  lipitor.     12/13/20: her gyn is managing her thyroid meds which she stopped taking months ago. She did not see Dr. Melchor again. Gets estrogen pellets every 15 weeks and she is satisfied with them. She just restarted the atorvastatin last week because she was coming in today. She did not get a flu vaccine and does not want it. She professes that she gets sick for a week after each flu vaccine. 2-3 weeks ago she developed some discomfort in the LLQ and took oregano oil which she perceived made it go away.  Having a mammogram in January. UTD with gyn who is also managing her thyroid meds.     5/19/21: doing great, working part time. Had mammo and was normal and her hormones are at proper levels now. Gyn manages her thyroid and progesterone, and gets estrogen and testosterone pellets injected every 15 weeks. She taking atorvastatin and fish oil. Vitamin D3, K2,zinc,C, drinks mushroom tea daily. 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 She is eating better and works out some. Back has been bothering her more from being on her feet more. Had an illness which is compatible with COVID about 2-3 months ago, and has recovered completely, including sense of smell.     5/16/22: stopped lipitor 2 months ago due to muscle aches. Did not call. She has been on 6 weeks of fish oil 1 bid .she has not been eating healthily however.  She has been on biktarvy for 3 mo.   She is going to be a grandmother, son in [place] in [place].     11/14/22: since last visit, her back was out on a few occasions. Thinks she had the flu, but did not call, 2-3 weeks ago. No fever, bad headache, coughing, with sputum and rhinorrhea. Did not go for lab. Taking DIM supplement, Biote' (estrogen/testosterone) pellets, thyroid po. Declines flu vaccine. Had normal colonoscopy. Mammogram due in January.    5/15/23: going to have re-do breast augmentation this week, per Dr. Somers, at his surgical suite. She is up  "to date on her mammogram 2023. She is doing well in general. She lost her 14 year old dog to dementia/illness in the last few days.     23:last visit after over 15 years of care(records in [facility] and paper chart).    doing well. On biktarvy since 2018. Now getting meds from [facility]. Dr. Brush is managing her thyroid. Had breast augmentation to reduce volume and not pleased with them. Silicone was replaced by saline. She feels better however. Did not have labs since last November.   She was in the [employer] and her son is a chief escalera officer now.   =================  [place]   [place]  ============    2024   Pleasant, soft spoken, compliant with meds; Health conscious; takes multiple vitamins d3/K2. Mag., Se, fish oil qid, zinc, iodine,  hormone supplement pellets lets q 15 weeks progastrin/testosterone; NP manages thyroid,   We reviewed HIV hx. She received frequent blood transfusions in 80s ->Younguest son in was born in    She had Back sx , "cage in the back:? Chronic Back pains, " no idea when will it hit" she weaned self from opiates, and is not following with pain management MD any more. + second hand smoking (Parents smoked when she was a child)  Retired [employer]  BP ok     "I do not do vaccines"  Mamogram/Bone density cherry - follows with obgyn    10/19/2024  No new complaints.  Compliant with meds  She had bl upper Blepharoplasty, which improved visual field  Mammogram will be due in Feb  She just had a gyn exam and all was fine all fine  Looking for a new PCP  She will follow with ophthalmologist  Dentist follow up  in   Pleasant as usual, Compliant with meds. No new sexual encounters.Talks with love about her  , [place]  She attends [employer] that has a strong mission to help the community- this makes her happy  + gained weight, about 15 lb,  She jokes and says it is bc women at [employer] are bringing cookies. She promises to  eat " healthier and exercise        Current Outpatient Medications:     co-enzyme Q-10 30 mg capsule, Take 30 mg by mouth 2 (two) times daily., Disp: , Rfl:     NP THYROID 30 mg Tab, Take 30 mg by mouth once daily., Disp: , Rfl:     NP THYROID 60 mg Tab, Take 60 mg by mouth once daily., Disp: , Rfl:     omega-3 fatty acids/fish oil (FISH OIL-OMEGA-3 FATTY ACIDS) 300-1,000 mg capsule, Take 4 capsules by mouth once daily., Disp: , Rfl:     progesterone (PROMETRIUM) 100 MG capsule, Take 200 mg by mouth every evening., Disp: , Rfl: 11    selenium 50 mcg Tab, Take 200 mcg by mouth once daily., Disp: , Rfl:     vitamin D (VITAMIN D3) 1000 units Tab, Take 5,000 Units by mouth once daily. drops, Disp: , Rfl:     zinc sulfate (ZINCATE) 50 mg zinc (220 mg) capsule, Take 220 mg by mouth 3 (three) times daily. Dosage unconfirmed, Disp: , Rfl:     jxbqyxrrx-gxeomvnw-abodoio ala (BIKTARVY) -25 mg (25 kg or greater), Take 1 tablet by mouth once daily., Disp: 30 tablet, Rfl: 11    LIDOcaine (LIDODERM) 5 %, Place 1 patch onto the skin daily as needed (PAIN). Remove & Discard patch within 12 hours or as directed by MD, Disp: 60 patch, Rfl: 2      Review of patient's allergies indicates:   Allergen Reactions    Sulfa (sulfonamide antibiotics)      BActrim -- non stop  vomiting       Past Medical History:   Diagnosis Date    Diverticulitis 11/2017    HIV (human immunodeficiency virus infection) 2003    no complications, santiago<200 in 2003    Hx of migraines     ? Ocular    Hyperlipidemia     MVP (mitral valve prolapse) 2010    Negative ETT, nuclear stress 06/2015, echo w/myxomatous MV    Osteomyelitis of thoracic spine 2003    Enterobacter    Osteopenia 2008    Pyelonephritis 12/10/2012     Past Surgical History:   Procedure Laterality Date    BACK SURGERY  2003    Extensive surgery for thoracic vertebral osteomyelitis (enterobacter) with spinal cord compression. (decompressive laminectomy T11 with post instrumentation T7-8 and T12-L1     Breast Augmentation Bilateral 1984    With redo 2008 and 2023    feet  Bilateral     reconstruction     FOOT SURGERY  1978    Maybe 1979     HYSTERECTOMY  1988    And one ovary for endometriosis    Podiactric surgery  09/2016    and 06/2017 to remove 2 screws    RHIZOTOMY      for back pain    Shoulder Injections  2013    For Frozen shoulders     Social History     Socioeconomic History    Marital status:     Number of children: 3   Occupational History    Occupation: Disabled   Tobacco Use    Smoking status: Never    Smokeless tobacco: Never   Substance and Sexual Activity    Alcohol use: Yes     Comment: Rarely     Family History   Problem Relation Name Age of Onset    Stomach cancer Mother          Metastatic     Review of Systems   Constitutional:  Negative for appetite change, chills, diaphoresis, fatigue, fever and unexpected weight change.   HENT:  Negative for congestion, dental problem, ear pain, hearing loss, mouth sores, nosebleeds, postnasal drip, rhinorrhea, sinus pain, sore throat and trouble swallowing.    Eyes:  Negative for photophobia, pain and visual disturbance.        Wears readers   Respiratory:  Negative for cough, choking, chest tightness and shortness of breath.    Cardiovascular:  Negative for chest pain, palpitations and leg swelling.   Gastrointestinal:  Negative for abdominal pain, anal bleeding, blood in stool, constipation, diarrhea, nausea, rectal pain and vomiting.        Feels bloated after gluten   Endocrine: Negative for polydipsia and polyuria.        Progesterone testosterone pellets  thyroid is followed per Dr. Brush.    Genitourinary:  Negative for difficulty urinating, dysuria, flank pain, frequency, genital sores, hematuria, urgency and vaginal discharge.   Musculoskeletal:  Positive for arthralgias and back pain. Negative for joint swelling and myalgias.        No longer requires any pain medications for her back     Skin:  Negative for rash.  "  Allergic/Immunologic: Negative for environmental allergies, food allergies and immunocompromised state.   Neurological:  Negative for dizziness, syncope, speech difficulty, weakness, numbness and headaches.   Hematological:  Negative for adenopathy. Does not bruise/bleed easily.   Psychiatric/Behavioral:  Negative for dysphoric mood and sleep disturbance. The patient is not nervous/anxious.      Objective:      Blood pressure 116/72, pulse (P) 83, temperature 97 °F (36.1 °C), temperature source Temporal, height 5' 7" (1.702 m), weight 83.6 kg (184 lb 6.4 oz), SpO2 97%. Body mass index is 28.88 kg/m².  Vitals:    04/23/25 1005   BP: 116/72   Pulse: (P) 83   Temp: 97 °F (36.1 °C)     Physical Exam  Constitutional:       General: She is not in acute distress.     Appearance: She is not diaphoretic.   HENT:      Head: Atraumatic.      Comments: teeth in good condition,      Right Ear: External ear normal.      Left Ear: External ear normal.      Nose: Nose normal.   Eyes:      General: No scleral icterus.     Conjunctiva/sclera: Conjunctivae normal.      Pupils: Pupils are equal, round, and reactive to light.   Neck:      Vascular: No JVD.   Cardiovascular:      Rate and Rhythm: Normal rate and regular rhythm.      Heart sounds: Normal heart sounds.   Pulmonary:      Effort: Pulmonary effort is normal.      Breath sounds: Normal breath sounds. No wheezing or rales.   Chest:      Chest wall: No tenderness.   Abdominal:      General: Bowel sounds are normal. There is no distension.      Palpations: Abdomen is soft. There is no mass.      Tenderness: There is no abdominal tenderness. There is no guarding or rebound.   Genitourinary:     Comments:  per gyn  Musculoskeletal:         General: Normal range of motion.      Cervical back: Normal range of motion and neck supple.      Comments: Still reclines and sits up a little slowly because of her back     Lymphadenopathy:      Cervical: No cervical adenopathy.   Skin:     " General: Skin is warm and dry.      Findings: No erythema or rash.      Comments:   Cafe au lait spot on left flank   Neurological:      Mental Status: She is alert and oriented to person, place, and time.      Cranial Nerves: No cranial nerve deficit.      Sensory: No sensory deficit.      Coordination: Coordination normal.      Gait: Gait normal.      Deep Tendon Reflexes: Reflexes normal (normal reflexes except left AJ,).   Psychiatric:         Behavior: Behavior normal.         Thought Content: Thought content normal.      Comments: always pleasant and grateful   Wound:   Travel History: Born in [place], lived in [place], lived in [facility], [place]  Vaccine History:   Pneumovax less than 2006, Prevnar 2013,   flu vaccine 2012-15,   hepatitis A 1, 2 2008,   hepatitis B ×3   Menactra 2018,   shingrix × 2 2018, (prefers not to have any further vaccines)  Advanced Directive:   Safer Sex:   Gyn:  2023 july  Mammogram: 1/2022, 2/2023  Bone Density: 2011, osteopenia better, 10/2019       Colonoscopy: 2008 diverticulosis, 2017 polyps, colonoscopy normal 9/2022  PCP:         Recent Diagnostics:        Assessment and Plan:   Osteoarthritis, unspecified osteoarthritis type, unspecified site  -     LIDOcaine (LIDODERM) 5 %; Place 1 patch onto the skin daily as needed (PAIN). Remove & Discard patch within 12 hours or as directed by MD  Dispense: 60 patch; Refill: 2    HIV positive  -     fveldkofo-sdjoxzgr-amjbaxk ala (BIKTARVY) -25 mg (25 kg or greater); Take 1 tablet by mouth once daily.  Dispense: 30 tablet; Refill: 11  -     CBC Auto Differential; Future; Expected date: 04/23/2025  -     Comprehensive Metabolic Panel; Future; Expected date: 04/23/2025  -     CD4 T-Tetonia Cells; Future; Expected date: 04/23/2025  -     Quantiferon Gold TB; Future; Expected date: 04/23/2025  -     HIV RNA, Quantitative, PCR; Future; Expected date: 04/23/2025  -     Urinalysis; Future             Continue Biktarvy   Does not want  "any other vaccines  Need eye exam, dental exam and mammogram  Will consider bone density next time         Hiv table in process  HIV diagnosis   She received frequent blood transfusions in 80s (children and  were neg)   HIV risk factor   None //not sexually active   HIV viral load   <20   CD4 Roman    Unsure  ...285 in 2004   CD4 cell count   380--439-- 503--693-- 1012--889  56%--49%   Genotypic resistance testing/  Resistance mutations:       Antiretroviral regimen(s):    Biktarvy    OI Prophylaxis   Not needed    HLA- testing   Not needed    Tropism testing   Not needed          Opportunistic or other notable infections:           Toxo IgG Negative 2004,    TB testing (TST/ IGRA) Negative 2019, 2021,2023, 2024,     Syphilis serology RPR nonreactive 2019, 2020, 2023,    Hepatitis A    HBsAg        HBsAb   6/16/2017, 2024,  non reactive post vax      HBcAb Negative 2024,    HBeAb        HBeAg      HCVAb     Negative 2020   HBV DNA        HCV RNA Neg 2004   Chlamydia gonorrhea     Trichomonas    Herpes HSV 1/HSv2       hemoglobin A1c 5.5 in 2022   Fasting lipid profile -- working on diet exercise -- to follow w next visit   Prostate-specific antigen 55-70yo    Vitamin D                "normal per Dr. Brush"38--58.9   UA Prot neg (except trace x 1 when having UTI)(   CBC wnl   CMP wnl   Other TSH 2.4 on supplementation       Dilated fundoscopic exam UTD aware   Dental exam UTD aware   Bone densitometry >=49 yo   Bone Density: 2011, osteopenia better, 10/2019--    to follow w next visit   Colonoscopy at  45 yo     2008 diverticulosis, 2017 polyps, colonoscopy normal 9/2022   Mammography  Q1-2 yrs, 50-73 yo Mammogram: 1/2022, 2/2023-- Due- will order w next visit       Cervical Pap smear (with or without HPV testing in women >=30 years)   Follows with Gyn:  2023 July,      Low-dose helical chest CT  50 -79 yo, 20 pack-year (current smokers or former smokers having quit within the past 15 " years)   Not needed   Aortic aneurysm screening (x1  in men 65- 74 yo who have ever smoked)   Not needed        Other:  Depression   Screening for cognitive deficits  Weight assessment  Blood pressure   Tobacco use  No  depression  Active at [employer]  Working on weight   No smoking   Safer Sex:       Travel history    Born in [place], lived in [place], lived in [facility], [place]   Advance directives  No      Vaccine History    (prefers not to have any further vaccines):        COVID [] Yes [] No [] Unknown     Hepatitis A (Havrix)  (2 doses @ 0 and 6-12 months) [x] Yes [] No [] Unknown []Immune  hepatitis A 1, 2 2008,      Hepatitis B (Engerix/Recombivax)   (3 doses @ 0, 1 and 6 months) [x] Yes [] No [] Unknown  []Immune  hepatitis B ×3    Hepatitis B (Heplisav)   (2 doses @ 0 and 1 month) [] Yes [] No [] Unknown  []Immune     HPV (Gardasil): <44yo   (3 doses @ 0, 1-2 and 6 months) [] Yes [] No [] Unknown  []No longer indicated      Influenza [] Yes [] No [] Unknown 2012-15,    Meningococcal (Menveo/Menactra): >17yo  (2 doses @ least 8 weeks apart -> booster q5 years) [] Yes [] No [] Unknown  Menactra 2018,    Meningococcal B (MenB): not routinely indicated unless with asplenia, complement deficiency, eculizumab use, occupational exposure [] Yes [] No [] Unknown     MPX [] Yes [] No [] Unknown     Pneumonia  [] Yes [] No [] Unknown  Pneumovax less than 2006, Prevnar 2013,   RSV (pregnancy 32-36 wks OR >61yo) [] Yes [] No [] Unknown     Shingles (Shingrix): >17yo  (2 doses @ 0 and 2-6 months) [] Yes [] No [] Unknown  shingrix × 2 2018,    Tetanus [] Yes [] No [] Unknown                This note was created using voice recognition software that occasionally misinterpreted phrases or words.

## 2025-05-31 ENCOUNTER — PATIENT MESSAGE (OUTPATIENT)
Dept: INFECTIOUS DISEASES | Facility: CLINIC | Age: 65
End: 2025-05-31
Payer: MEDICARE

## 2025-05-31 DIAGNOSIS — E86.0 DEHYDRATION: Primary | ICD-10-CM

## 2025-06-01 ENCOUNTER — TELEPHONE (OUTPATIENT)
Dept: INFECTIOUS DISEASES | Facility: CLINIC | Age: 65
End: 2025-06-01
Payer: MEDICARE

## 2025-06-01 ENCOUNTER — RESULTS FOLLOW-UP (OUTPATIENT)
Dept: INFECTIOUS DISEASES | Facility: CLINIC | Age: 65
End: 2025-06-01
Payer: MEDICARE